# Patient Record
Sex: FEMALE | Race: ASIAN | NOT HISPANIC OR LATINO | Employment: UNEMPLOYED | URBAN - METROPOLITAN AREA
[De-identification: names, ages, dates, MRNs, and addresses within clinical notes are randomized per-mention and may not be internally consistent; named-entity substitution may affect disease eponyms.]

---

## 2021-02-16 ENCOUNTER — APPOINTMENT (OUTPATIENT)
Dept: OBGYN | Facility: CLINIC | Age: 31
End: 2021-02-16

## 2021-02-24 ENCOUNTER — INITIAL PRENATAL (OUTPATIENT)
Dept: OBGYN | Facility: CLINIC | Age: 31
End: 2021-02-24

## 2021-02-24 ENCOUNTER — HOSPITAL ENCOUNTER (OUTPATIENT)
Facility: MEDICAL CENTER | Age: 31
End: 2021-02-24
Attending: NURSE PRACTITIONER
Payer: COMMERCIAL

## 2021-02-24 VITALS
SYSTOLIC BLOOD PRESSURE: 100 MMHG | DIASTOLIC BLOOD PRESSURE: 58 MMHG | WEIGHT: 101 LBS | BODY MASS INDEX: 19.07 KG/M2 | HEIGHT: 61 IN

## 2021-02-24 DIAGNOSIS — Z34.02 SUPERVISION OF NORMAL FIRST PREGNANCY IN SECOND TRIMESTER: Primary | ICD-10-CM

## 2021-02-24 DIAGNOSIS — Z34.02 SUPERVISION OF NORMAL FIRST PREGNANCY IN SECOND TRIMESTER: ICD-10-CM

## 2021-02-24 PROCEDURE — 0500F INITIAL PRENATAL CARE VISIT: CPT | Performed by: NURSE PRACTITIONER

## 2021-02-24 PROCEDURE — 8198 PREG CTR PKG RATE (SYSTEM): Performed by: NURSE PRACTITIONER

## 2021-02-24 RX ORDER — ONDANSETRON 8 MG/1
8 TABLET, ORALLY DISINTEGRATING ORAL EVERY 8 HOURS PRN
Status: ON HOLD | COMMUNITY
End: 2021-07-08

## 2021-02-24 ASSESSMENT — ENCOUNTER SYMPTOMS
NEUROLOGICAL NEGATIVE: 1
RESPIRATORY NEGATIVE: 1
CONSTITUTIONAL NEGATIVE: 1
CARDIOVASCULAR NEGATIVE: 1
PSYCHIATRIC NEGATIVE: 1
EYES NEGATIVE: 1
GASTROINTESTINAL NEGATIVE: 1
MUSCULOSKELETAL NEGATIVE: 1

## 2021-02-24 NOTE — PROGRESS NOTES
Pt. Here for NOB visit today.  # [phone]  First prenatal care  Pt. States no complaints   Pharmacy verified  Pt would like AFP  Pt declines CF   Chaperone offered and not indicated

## 2021-02-24 NOTE — PROGRESS NOTES
S:  Adalid Cortez is a 30 y.o.  who presents for her new OB exam.  She is 15w5d with and FALGUNI of Estimated Date of Delivery: 21 based off of US . She has no complaints.  She is currently not working ,denies  heavy lifting or chemical exposure. Denies ER visits or previous care in this pregnancy, but did have some PNC in [facility], see media.     She did have some nausea in the beginning of his pregnancy and her parnter who is an RN, obtained a prescription for Zofran, so she had been using that up until recently because she is feeling better.  Did have discussion with them regarding small risk of palate abnormalities and cardiac defects with zofran use in first trimester.     Desires AFP.  Declines CF.  Denies VB, LOF, or cramping.  Denies dysuria, vaginal DC. Reports no fetal movement.     Pt is single and lives with FOB.  Pregnancy is planned.  FOB is Mart and he is involved    Discussed diet and exercise during pregnancy. Encouraged good nutrition, and daily exercise including walking or swimming. Discussed expected weight gain during pregnancy. Discussed adequate hydration during pregnancy.  Review of Systems   Constitutional: Negative.    HENT: Negative.    Eyes: Negative.    Respiratory: Negative.    Cardiovascular: Negative.    Gastrointestinal: Negative.    Genitourinary: Negative.    Musculoskeletal: Negative.    Skin: Negative.    Neurological: Negative.    Endo/Heme/Allergies: Negative.    Psychiatric/Behavioral: Negative.    All other systems reviewed and are negative.      History reviewed. No pertinent past medical history.  Family History   Problem Relation Age of Onset   • Diabetes Mother    • No Known Problems Father    • No Known Problems Sister    • No Known Problems Brother      Social History     Socioeconomic History   • Marital status: Single     Spouse name: Not on file   • Number of children: Not on file   • Years of education: Not on file   • Highest education  "level: Not on file   Occupational History   • Not on file   Tobacco Use   • Smoking status: Never Smoker   • Smokeless tobacco: Never Used   Substance and Sexual Activity   • Alcohol use: Not Currently   • Drug use: Not Currently   • Sexual activity: Yes     Partners: Male     Comment: None    Other Topics Concern   • Not on file   Social History Narrative   • Not on file     Social Determinants of Health     Financial Resource Strain:    • Difficulty of Paying Living Expenses:    Food Insecurity:    • Worried About Running Out of Food in the Last Year:    • Ran Out of Food in the Last Year:    Transportation Needs:    • Lack of Transportation (Medical):    • Lack of Transportation (Non-Medical):    Physical Activity:    • Days of Exercise per Week:    • Minutes of Exercise per Session:    Stress:    • Feeling of Stress :    Social Connections:    • Frequency of Communication with Friends and Family:    • Frequency of Social Gatherings with Friends and Family:    • Attends [employer] Services:    • Active Member of Clubs or Organizations:    • Attends Club or Organization Meetings:    • Marital Status:    Intimate Partner Violence:    • Fear of Current or Ex-Partner:    • Emotionally Abused:    • Physically Abused:    • Sexually Abused:      OB History    Para Term  AB Living   1             SAB TAB Ectopic Molar Multiple Live Births                    # Outcome Date GA Lbr Justus/2nd Weight Sex Delivery Anes PTL Lv   1 Current                History of Varicella Virus: unsure  History of HSV I or II in self or partner: denies  History of Thyroid problems: denies    O:  Ht 1.549 m (5' 1\")   Wt 45.8 kg (101 lb)    See Prenatal Physical.    Wet mount: deferred  Physical Exam   Constitutional: She is oriented to person, place, and time and well-developed, well-nourished, and in no distress.   HENT:   Head: Normocephalic and atraumatic.   Right Ear: External ear normal.   Left Ear: External ear normal. "   Eyes: Pupils are equal, round, and reactive to light. Conjunctivae and EOM are normal.   Neck: No thyromegaly present.   Cardiovascular: Normal rate, regular rhythm, normal heart sounds and intact distal pulses.   Pulmonary/Chest: Effort normal and breath sounds normal. No respiratory distress.   Normal breast exam   Abdominal: Soft. Bowel sounds are normal. There is no abdominal tenderness.   Genitourinary:    Vagina and cervix normal.     Musculoskeletal:         General: No edema. Normal range of motion.      Cervical back: Normal range of motion and neck supple.   Neurological: She is alert and oriented to person, place, and time. Gait normal.   Skin: Skin is warm and dry.   Psychiatric: Mood, memory, affect and judgment normal.   Nursing note and vitals reviewed.        A:   1.  IUP @ 15w5d per 6 week US        2.  S=D        3.  See problem list below        4.  Supervision of normal first pregnancy      There are no problems to display for this patient.        P:  1.  GC/CT & pap done        2.  Prenatal labs ordered - lab slip given        3.  Discussed PNV, diet, avoidances and adequate water intake        4.  NOB packet given        5.  Return to office in 4 wks        6.  Complete OB US in 5 wks           Orders Placed This Encounter   • US-OB 2ND 3RD TRI COMPLETE   • URINE DRUG SCREEN W/CONF (AR)   • HEP C VIRUS ANTIBODY   • THINPREP PAP W/HPV AND CTNG   • Prenatal MV-Min-Fe Fum-FA-DHA (PRENATAL 1 PO)   • ondansetron (ZOFRAN ODT) 8 MG TABLET DISPERSIBLE

## 2021-02-25 LAB
C TRACH DNA GENITAL QL NAA+PROBE: NEGATIVE
CYTOLOGY REG CYTOL: NORMAL
HPV HR 12 DNA CVX QL NAA+PROBE: NEGATIVE
HPV16 DNA SPEC QL NAA+PROBE: NEGATIVE
HPV18 DNA SPEC QL NAA+PROBE: NEGATIVE
N GONORRHOEA DNA GENITAL QL NAA+PROBE: NEGATIVE
SPECIMEN SOURCE: NORMAL
SPECIMEN SOURCE: NORMAL

## 2021-02-26 ENCOUNTER — OFFICE VISIT (OUTPATIENT)
Dept: URGENT CARE | Facility: CLINIC | Age: 31
End: 2021-02-26

## 2021-02-26 VITALS
RESPIRATION RATE: 16 BRPM | OXYGEN SATURATION: 96 % | TEMPERATURE: 98.6 F | HEIGHT: 61 IN | DIASTOLIC BLOOD PRESSURE: 76 MMHG | WEIGHT: 102 LBS | BODY MASS INDEX: 19.26 KG/M2 | HEART RATE: 96 BPM | SYSTOLIC BLOOD PRESSURE: 100 MMHG

## 2021-02-26 DIAGNOSIS — R30.0 DYSURIA: ICD-10-CM

## 2021-02-26 DIAGNOSIS — Z3A.16 16 WEEKS GESTATION OF PREGNANCY: ICD-10-CM

## 2021-02-26 DIAGNOSIS — N30.00 ACUTE CYSTITIS WITHOUT HEMATURIA: ICD-10-CM

## 2021-02-26 LAB
APPEARANCE UR: CLEAR
BILIRUB UR STRIP-MCNC: NEGATIVE MG/DL
COLOR UR AUTO: YELLOW
GLUCOSE UR STRIP.AUTO-MCNC: NEGATIVE MG/DL
INT CON NEG: NEGATIVE
INT CON POS: POSITIVE
KETONES UR STRIP.AUTO-MCNC: 40 MG/DL
LEUKOCYTE ESTERASE UR QL STRIP.AUTO: NORMAL
NITRITE UR QL STRIP.AUTO: NEGATIVE
PH UR STRIP.AUTO: 7 [PH] (ref 5–8)
POC URINE PREGNANCY TEST: POSITIVE
PROT UR QL STRIP: NORMAL MG/DL
RBC UR QL AUTO: NORMAL
SP GR UR STRIP.AUTO: 1.01
UROBILINOGEN UR STRIP-MCNC: 1 MG/DL

## 2021-02-26 PROCEDURE — 81025 URINE PREGNANCY TEST: CPT | Performed by: PHYSICIAN ASSISTANT

## 2021-02-26 PROCEDURE — 99204 OFFICE O/P NEW MOD 45 MIN: CPT | Performed by: PHYSICIAN ASSISTANT

## 2021-02-26 PROCEDURE — 81002 URINALYSIS NONAUTO W/O SCOPE: CPT | Performed by: PHYSICIAN ASSISTANT

## 2021-02-26 RX ORDER — NITROFURANTOIN 25; 75 MG/1; MG/1
100 CAPSULE ORAL EVERY 12 HOURS
Qty: 10 CAPSULE | Refills: 0 | Status: SHIPPED | OUTPATIENT
Start: 2021-02-26 | End: 2021-03-03

## 2021-02-26 ASSESSMENT — ENCOUNTER SYMPTOMS
CARDIOVASCULAR NEGATIVE: 1
CHILLS: 1
DIARRHEA: 0
FEVER: 1
ABDOMINAL PAIN: 1
NAUSEA: 1
FLANK PAIN: 0
VOMITING: 1
RESPIRATORY NEGATIVE: 1

## 2021-02-27 ENCOUNTER — HOSPITAL ENCOUNTER (OUTPATIENT)
Facility: MEDICAL CENTER | Age: 31
End: 2021-02-27
Attending: PHYSICIAN ASSISTANT

## 2021-02-27 DIAGNOSIS — N30.00 ACUTE CYSTITIS WITHOUT HEMATURIA: ICD-10-CM

## 2021-02-27 PROCEDURE — 87086 URINE CULTURE/COLONY COUNT: CPT

## 2021-02-27 PROCEDURE — 87077 CULTURE AEROBIC IDENTIFY: CPT

## 2021-02-27 PROCEDURE — 87186 SC STD MICRODIL/AGAR DIL: CPT

## 2021-02-27 NOTE — PROGRESS NOTES
Subjective:      Adalid Cortez is a 30 y.o. female who presents with UTI (x2 days, fever up to 102.6, chills, vomiting, frequent urination, sensation of pressure in bladder )            Patient 16 weeks pregnant.  She has had 2 days of dysuria, frequency, lower abdominal pressure.  Intermittent fever and chills with some vomiting.  No vaginal pain, bleeding noted.    Dysuria   This is a new problem. The current episode started yesterday. The problem occurs every urination. The problem has been gradually worsening. The quality of the pain is described as burning. The maximum temperature recorded prior to her arrival was 102 - 102.9 F. The fever has been present for less than 1 day. There is no history of pyelonephritis. Associated symptoms include chills, frequency, nausea, a possible pregnancy, urgency and vomiting. Pertinent negatives include no flank pain or hematuria. She has tried acetaminophen for the symptoms. The treatment provided mild relief.       PMH:  has no past medical history on file.  MEDS:   Current Outpatient Medications:   •  Prenatal MV-Min-Fe Fum-FA-DHA (PRENATAL 1 PO), Take  by mouth., Disp: , Rfl:   •  ondansetron (ZOFRAN ODT) 8 MG TABLET DISPERSIBLE, Take 8 mg by mouth every 8 hours as needed for Nausea., Disp: , Rfl:   ALLERGIES: No Known Allergies  SURGHX: No past surgical history on file.  SOCHX:  reports that she has never smoked. She has never used smokeless tobacco. She reports previous alcohol use. She reports previous drug use.  FH: family history includes Diabetes in her mother; No Known Problems in her brother, father, and sister.    Review of Systems   Constitutional: Positive for chills and fever.   Respiratory: Negative.    Cardiovascular: Negative.    Gastrointestinal: Positive for abdominal pain, nausea and vomiting. Negative for diarrhea.   Genitourinary: Positive for dysuria, frequency and urgency. Negative for flank pain and hematuria.       Medications,  "Allergies, and current problem list reviewed today in Epic     Objective:     /76 (Patient Position: Sitting)   Pulse 96   Temp 37 °C (98.6 °F) (Temporal)   Resp 16   Ht 1.549 m (5' 1\")   Wt 46.3 kg (102 lb)   SpO2 96%   BMI 19.27 kg/m²      Physical Exam  Vitals and nursing note reviewed.   Constitutional:       General: She is not in acute distress.     Appearance: She is well-developed. She is not diaphoretic.   HENT:      Head: Normocephalic and atraumatic.      Right Ear: Tympanic membrane, ear canal and external ear normal.      Left Ear: Tympanic membrane, ear canal and external ear normal.      Nose: Nose normal.      Mouth/Throat:      Mouth: Mucous membranes are moist.   Eyes:      Conjunctiva/sclera: Conjunctivae normal.   Cardiovascular:      Rate and Rhythm: Normal rate and regular rhythm.      Heart sounds: Normal heart sounds.   Pulmonary:      Effort: Pulmonary effort is normal. No respiratory distress.      Breath sounds: Normal breath sounds. No wheezing, rhonchi or rales.   Abdominal:      General: Abdomen is flat. There is no distension.      Palpations: Abdomen is soft.      Tenderness: There is abdominal tenderness. There is no right CVA tenderness, left CVA tenderness, guarding or rebound.      Comments: Lower abdominal pressure to palpation.  No sharp pains.   Musculoskeletal:      Cervical back: Normal range of motion and neck supple.      Right lower leg: No edema.      Left lower leg: No edema.   Skin:     General: Skin is warm and dry.   Neurological:      Mental Status: She is alert and oriented to person, place, and time.   Psychiatric:         Behavior: Behavior normal.         Thought Content: Thought content normal.         Judgment: Judgment normal.                 Assessment/Plan:         1. Dysuria  POCT Urinalysis    POCT PREGNANCY   2. Acute cystitis without hematuria  Urine Culture    nitrofurantoin (MACROBID) 100 MG Cap   3. 16 weeks gestation of pregnancy   "     Urinalysis: Small blood, large leukocytes    16-week pregnant female presents with 2 days of urinary symptoms.  Some fevers and chills but none today.  One bout of emesis but she is unsure if this is pregnancy related as she has had nausea.  She denies sharp abdominal pain, vaginal pain, vaginal bleeding, back pain.  Eating and drinking normal.  Her urinalysis is consistent with a lower urinary tract infection.  Her vital signs are normal specifically blood pressure, temperature, pulse.  Her exam shows lower abdominal pressure without sharp pains.  No CVA tenderness, rebound or guarding.  There are no clinical signs or symptoms of pyelonephritis at this time.  Strict ER precautions discussed at length.  Recommended follow-up with OB early next week.  OTC meds and conservative measures as discussed    Return to clinic or go to ED if symptoms worsen or persist. Indications for ED discussed at length. Patient/Parent/Guardian voices understanding. Follow-up with your primary care provider in 3-5 days. Red flag symptoms discussed. All side effects of medication discussed including allergic response, GI upset, tendon injury, rash, sedation etc.    Please note that this dictation was created using voice recognition software. I have made every reasonable attempt to correct obvious errors, but I expect that there are errors of grammar and possibly content that I did not discover before finalizing the note.

## 2021-03-01 LAB
BACTERIA UR CULT: ABNORMAL
BACTERIA UR CULT: ABNORMAL
SIGNIFICANT IND 70042: ABNORMAL
SITE SITE: ABNORMAL
SOURCE SOURCE: ABNORMAL

## 2021-03-08 ENCOUNTER — HOSPITAL ENCOUNTER (OUTPATIENT)
Dept: LAB | Facility: MEDICAL CENTER | Age: 31
End: 2021-03-08
Attending: NURSE PRACTITIONER
Payer: COMMERCIAL

## 2021-03-08 DIAGNOSIS — Z34.02 SUPERVISION OF NORMAL FIRST PREGNANCY IN SECOND TRIMESTER: ICD-10-CM

## 2021-03-08 LAB
ABO GROUP BLD: NORMAL
RH BLD: NORMAL

## 2021-03-09 LAB — HCV AB SER QL: NORMAL

## 2021-03-10 LAB
# FETUSES US: NORMAL
AFP MOM SERPL: 0.89
AFP SERPL-MCNC: 55 NG/ML
AGE - REPORTED: 32.9 YR
AMPHET CTO UR CFM-MCNC: NEGATIVE NG/ML
BARBITURATES CTO UR CFM-MCNC: NEGATIVE NG/ML
BENZODIAZ CTO UR CFM-MCNC: NEGATIVE NG/ML
CANNABINOIDS CTO UR CFM-MCNC: NEGATIVE NG/ML
COCAINE CTO UR CFM-MCNC: NEGATIVE NG/ML
CURRENT SMOKER: NO
DRUG COMMENT 753798: NORMAL
FAMILY MEMBER DISEASES HX: NO
GA METHOD: NORMAL
GA: NORMAL WK
HCG MOM SERPL: 0.79
HCG SERPL-ACNC: NORMAL IU/L
HX OF HEREDITARY DISORDERS: NO
IDDM PATIENT QL: NO
INHIBIN A MOM SERPL: 0.95
INHIBIN A SERPL-MCNC: 162 PG/ML
INTEGRATED SCN PATIENT-IMP: NORMAL
METHADONE CTO UR CFM-MCNC: NEGATIVE NG/ML
OPIATES CTO UR CFM-MCNC: NEGATIVE NG/ML
PATHOLOGY STUDY: NORMAL
PCP CTO UR CFM-MCNC: NEGATIVE NG/ML
PROPOXYPH CTO UR CFM-MCNC: NEGATIVE NG/ML
SPECIMEN DRAWN SERPL: NORMAL
U ESTRIOL MOM SERPL: 1.05
U ESTRIOL SERPL-MCNC: 2.39 NG/ML

## 2021-03-22 PROBLEM — Z34.00 PREGNANCY, SUPERVISION OF FIRST: Status: ACTIVE | Noted: 2021-03-22

## 2021-03-24 ENCOUNTER — ROUTINE PRENATAL (OUTPATIENT)
Dept: OBGYN | Facility: CLINIC | Age: 31
End: 2021-03-24

## 2021-03-24 VITALS — DIASTOLIC BLOOD PRESSURE: 58 MMHG | WEIGHT: 102.6 LBS | SYSTOLIC BLOOD PRESSURE: 100 MMHG | BODY MASS INDEX: 19.39 KG/M2

## 2021-03-24 DIAGNOSIS — Z34.02 ENCOUNTER FOR PRENATAL CARE IN SECOND TRIMESTER OF FIRST PREGNANCY: ICD-10-CM

## 2021-03-24 PROCEDURE — 90040 PR PRENATAL FOLLOW UP: CPT | Performed by: NURSE PRACTITIONER

## 2021-03-24 NOTE — PROGRESS NOTES
OB follow up   + fetal movement.  No VB, LOF or UC's.  Pt states she is having pain in her lower back  AFP Negative  [phone] (home)   Preferred pharmacy confirmed.

## 2021-03-24 NOTE — PROGRESS NOTES
SUBJECTIVE:  Pt is a 30 y.o.   at 19w5d  gestation. Presents today for follow-up prenatal care. Reports no issues at this time.  Reports fetal movement. Denies regular cramping/contractions, bleeding or leaking of fluid. Denies dysuria, headaches, N/V. Generally feels well today, reports lower back soreness but not more UTI symptoms, took full week of antibiotics and reports no prior UTI. She is Hoah[employer]ism and is wondering about how she can take part in Ramadan as a pregnant person.     OBJECTIVE:  - See prenatal vitals flow  -   Vitals:    21 1322   BP: 100/58   Weight: 46.5 kg (102 lb 9.6 oz)                 ASSESSMENT:   - IUP at 19w5d    - S=D  Patient Active Problem List    Diagnosis Date Noted   • Supervision of first pregnancy  2021         PLAN:  - S/sx pregnancy and labor warning signs vs general discomforts discussed  - Fetal movements and/or kick counts reviewed   - Adequate hydration reinforced  - Nutrition/exercise/vitamin education; continue PNV  - Urine SHAWNEE  - Already got flu vaccine beginning of this winter   - Reviewed that she may have a lot of side effects from not eating or drinking during Ramadan times, especially in relation to her recent UTI  - US scheduled   - Anticipatory guidance given  - RTC in 4 weeks for follow-up prenatal care

## 2021-03-31 ENCOUNTER — APPOINTMENT (OUTPATIENT)
Dept: RADIOLOGY | Facility: IMAGING CENTER | Age: 31
End: 2021-03-31
Attending: NURSE PRACTITIONER

## 2021-03-31 DIAGNOSIS — Z34.02 SUPERVISION OF NORMAL FIRST PREGNANCY IN SECOND TRIMESTER: ICD-10-CM

## 2021-03-31 PROCEDURE — 76805 OB US >/= 14 WKS SNGL FETUS: CPT | Mod: TC | Performed by: NURSE PRACTITIONER

## 2021-04-07 ENCOUNTER — HOSPITAL ENCOUNTER (OUTPATIENT)
Facility: MEDICAL CENTER | Age: 31
End: 2021-04-07
Attending: NURSE PRACTITIONER
Payer: COMMERCIAL

## 2021-04-07 DIAGNOSIS — Z34.02 ENCOUNTER FOR PRENATAL CARE IN SECOND TRIMESTER OF FIRST PREGNANCY: ICD-10-CM

## 2021-04-07 LAB
APPEARANCE UR: CLEAR
BACTERIA #/AREA URNS HPF: ABNORMAL /HPF
BILIRUB UR QL STRIP.AUTO: NEGATIVE
COLOR UR: YELLOW
EPI CELLS #/AREA URNS HPF: ABNORMAL /HPF
GLUCOSE UR STRIP.AUTO-MCNC: 250 MG/DL
HYALINE CASTS #/AREA URNS LPF: ABNORMAL /LPF
KETONES UR STRIP.AUTO-MCNC: NEGATIVE MG/DL
LEUKOCYTE ESTERASE UR QL STRIP.AUTO: ABNORMAL
MICRO URNS: ABNORMAL
NITRITE UR QL STRIP.AUTO: NEGATIVE
PH UR STRIP.AUTO: 7 [PH] (ref 5–8)
PROT UR QL STRIP: NEGATIVE MG/DL
RBC # URNS HPF: ABNORMAL /HPF
RBC UR QL AUTO: NEGATIVE
SP GR UR STRIP.AUTO: 1.02
UROBILINOGEN UR STRIP.AUTO-MCNC: 1 MG/DL
WBC #/AREA URNS HPF: ABNORMAL /HPF

## 2021-04-21 ENCOUNTER — ROUTINE PRENATAL (OUTPATIENT)
Dept: OBGYN | Facility: CLINIC | Age: 31
End: 2021-04-21

## 2021-04-21 VITALS — DIASTOLIC BLOOD PRESSURE: 46 MMHG | BODY MASS INDEX: 19.65 KG/M2 | SYSTOLIC BLOOD PRESSURE: 90 MMHG | WEIGHT: 104 LBS

## 2021-04-21 DIAGNOSIS — Z34.02 ENCOUNTER FOR PRENATAL CARE IN SECOND TRIMESTER OF FIRST PREGNANCY: Primary | ICD-10-CM

## 2021-04-21 PROCEDURE — 90040 PR PRENATAL FOLLOW UP: CPT | Performed by: NURSE PRACTITIONER

## 2021-04-21 NOTE — PROGRESS NOTES
S) Pt is a 30 y.o.   at 23w5d  gestation. Routine prenatal care today. Pt participating in Ramaden, fasting x 9 days now but feeling well.  Wakes at 0430 for last meal of day.   Fetal movement Normal  Cramping no  VB no  LOF no   Denies dysuria. Generally feels well today. Good self-care activities identified. Denies headaches, swelling, visual changes, or epigastric pain .     O) See flow sheet for vital signs and fetal measurements.          Labs:       PNL:  WNL       GCT:       AFP: normal       GBS: N/A       Pertinent ultrasound - 3/31/21 normal anatomy scan           A) IUP at 23w5d       S=D         Patient Active Problem List    Diagnosis Date Noted   • Supervision of first pregnancy  2021          SVE: deferred         TDAP: no       FLU: no        BTL: no       : no       C/S Consent: no       IOL or C/S scheduled: no       LAST PAP: 21 NILM         P) s/s ptl vs general discomforts. Fetal movements reviewed. General ed and anticipatory guidance. Nutrition/exercise/vitamin. Plans breast Plans pp contraception- unsure  Continue PNV.   Given 3rd trimester labs with instructions  RTC 4 weeks or PRN  Discussed TDAP next visit

## 2021-04-21 NOTE — PROGRESS NOTES
Pt here today for OB follow up  Reports +FM  WT: 104 lb  BP: 90/46  Preferred pharmacy verified with pt.  Pt states no complaints or concerns today  3rd trimester labs ordered today, pt given instructions.  Tyree # [phone]

## 2021-05-10 ENCOUNTER — HOSPITAL ENCOUNTER (OUTPATIENT)
Dept: LAB | Facility: MEDICAL CENTER | Age: 31
End: 2021-05-10
Attending: NURSE PRACTITIONER
Payer: COMMERCIAL

## 2021-05-10 DIAGNOSIS — Z34.02 ENCOUNTER FOR PRENATAL CARE IN SECOND TRIMESTER OF FIRST PREGNANCY: ICD-10-CM

## 2021-05-10 LAB
ERYTHROCYTE [DISTWIDTH] IN BLOOD BY AUTOMATED COUNT: 44.1 FL (ref 35.9–50)
GLUCOSE 1H P 50 G GLC PO SERPL-MCNC: 184 MG/DL (ref 70–139)
HCT VFR BLD AUTO: 34.3 % (ref 37–47)
HGB BLD-MCNC: 11.2 G/DL (ref 12–16)
MCH RBC QN AUTO: 30.3 PG (ref 27–33)
MCHC RBC AUTO-ENTMCNC: 32.7 G/DL (ref 33.6–35)
MCV RBC AUTO: 92.7 FL (ref 81.4–97.8)
PLATELET # BLD AUTO: 407 K/UL (ref 164–446)
PMV BLD AUTO: 8.5 FL (ref 9–12.9)
RBC # BLD AUTO: 3.7 M/UL (ref 4.2–5.4)
TREPONEMA PALLIDUM IGG+IGM AB [PRESENCE] IN SERUM OR PLASMA BY IMMUNOASSAY: NORMAL
WBC # BLD AUTO: 14.6 K/UL (ref 4.8–10.8)

## 2021-05-12 DIAGNOSIS — R73.09 ELEVATED GLUCOSE TOLERANCE TEST: ICD-10-CM

## 2021-05-13 NOTE — PROGRESS NOTES
Called pt regarding last mychart message from 21.  Verified name and . Advised pt to go to L&D for assessment as still not feeling well today with fever persisting.

## 2021-05-28 ENCOUNTER — ROUTINE PRENATAL (OUTPATIENT)
Dept: OBGYN | Facility: CLINIC | Age: 31
End: 2021-05-28

## 2021-05-28 VITALS — DIASTOLIC BLOOD PRESSURE: 60 MMHG | SYSTOLIC BLOOD PRESSURE: 100 MMHG | BODY MASS INDEX: 19.69 KG/M2 | WEIGHT: 104.2 LBS

## 2021-05-28 DIAGNOSIS — Z34.03 ENCOUNTER FOR PRENATAL CARE IN THIRD TRIMESTER OF FIRST PREGNANCY: Primary | ICD-10-CM

## 2021-05-28 PROCEDURE — 90715 TDAP VACCINE 7 YRS/> IM: CPT | Performed by: NURSE PRACTITIONER

## 2021-05-28 PROCEDURE — 90471 IMMUNIZATION ADMIN: CPT | Performed by: NURSE PRACTITIONER

## 2021-05-28 PROCEDURE — 90040 PR PRENATAL FOLLOW UP: CPT | Performed by: NURSE PRACTITIONER

## 2021-05-28 NOTE — LETTER
"Count Your Baby's Movements  Another step to a healthy delivery    Negar Cortez             Dept: [phone]    How Many Weeks Pregnant? 29W0D    Date to Begin Countin2021              How to use this chart    One way for your physician to keep track of your baby's health is by knowing how often the baby moves (or \"kicks\") in your womb.  You can help your physician to do this by using this chart every day.    Every day, you should see how many hours it takes for your baby to move 10 times.  Start in the morning, as soon as you get up.    · First, write down the time your baby moves until you get to 10.  · Check off one box every time your baby moves until you get to 10.  · Write down the time you finished counting in the last column.  · Total how long it took to count up all 10 movements.  · Finally, fill in the box that shows how long this took.  After counting 10 movements, you no longer have to count any more that day.  The next morning, just start counting again as soon as you get up.    What should you call a \"movement\"?  It is hard to say, because it will feel different from one mother to another and from one pregnancy to the next.  The important thing is that you count the movements the same way throughout your pregnancy.  If you have more questions, you should ask your physician.    Count carefully every day!  SAMPLE:  Week 28    How many hours did it take to feel 10 movements?       Start  Time     1     2     3     4     5     6     7     8     9     10   Finish Time   Mon 8:20 ·  ·  ·  ·  ·  ·  ·  ·  ·  ·  11:40                  Sat               Sun                 IMPORTANT: You should contact your physician if it takes more than two hours for you to feel 10 movements.  Each morning, write down the time and start to count the movements of your baby.  Keep track by checking off one box every time you feel one movement.  When you have " "felt 10 \"kicks\", write down the time you finished counting in the last column.  Then fill in the   box (over the check ar) for the number of hours it took.  Be sure to read the complete instructions on the previous page.            "

## 2021-05-28 NOTE — PROGRESS NOTES
OB follow up   + fetal movement. Active  No VB, LOF or UC's.  Phone #  [phone]  Preferred pharmacy confirmed.  C/o  Cramping that comes and goes   BTL Ask next appt   TDAP Today  Kick count sheet given today.    TDAP  NDC: 21873-117-82  LOT#: [id number]  Expiration Date: 2023  Dose: 0.5mL  Site: Left Deltoid  Patient educated on use and side effects of medication. Name and  verified prior to injection. Pt tolerated? Well   Verified by NUBIA  Administered by Katie Mello, Med Ass't at 3:22 PM.  Patient Provided Medication: no

## 2021-05-28 NOTE — PROGRESS NOTES
S:  Reports she went to the hospital ([facility]) and had an inflamed gallbladder.  They removed it on Tuesday.  She has been feeling much better.  Still needs to make a post-op appt.  Still needs to complete her 3hr GTT.  Also notes some cramping, usually at night.  Reports good FM.  Denies VB, LOF, RUCs or vaginal DC.     O:  See flowsheet.   Incisions are CDI.    A:  IUP at 29w0d  Patient Active Problem List    Diagnosis Date Noted   • Supervision of first pregnancy  2021        P:  1.  Encouraged pt to make post-op appt w surgeon.        2.  Instructions given on FKCs.          3.  Questions answered.          4.  Encouraged pt to complete 3hr GTT asap.        5.  Encourage adequate water intake.        6.  D/w pt helps for cramps        7.   labor precautions reviewed.         8.  F/u 2 wks.        9.  TDap given.    I have placed the below orders and discussed them with an approved delegating provider. The MA is performing the below orders under the direction of  Dr. Edmondson.

## 2021-06-02 ENCOUNTER — HOSPITAL ENCOUNTER (OUTPATIENT)
Dept: LAB | Facility: MEDICAL CENTER | Age: 31
End: 2021-06-02
Attending: NURSE PRACTITIONER
Payer: COMMERCIAL

## 2021-06-02 DIAGNOSIS — Z3A.29 29 WEEKS GESTATION OF PREGNANCY: ICD-10-CM

## 2021-06-02 DIAGNOSIS — R73.09 ELEVATED GLUCOSE TOLERANCE TEST: ICD-10-CM

## 2021-06-02 LAB
GLUCOSE 1H P CHAL SERPL-MCNC: 269 MG/DL (ref 65–180)
GLUCOSE 2H P CHAL SERPL-MCNC: 272 MG/DL (ref 65–155)
GLUCOSE 3H P CHAL SERPL-MCNC: 238 MG/DL (ref 65–140)
GLUCOSE BS SERPL-MCNC: 107 MG/DL (ref 65–95)

## 2021-06-03 ENCOUNTER — TELEPHONE (OUTPATIENT)
Dept: OBGYN | Facility: CLINIC | Age: 31
End: 2021-06-03

## 2021-06-03 NOTE — TELEPHONE ENCOUNTER
----- Message from PETAR Caruso sent at 6/2/2021  8:52 PM PDT -----  Pt is diabetic, needs hgba1c, diabetic ed and MD appt.       6/3/2021 1149 Called pt but no answer and unable to leave VM. mail box is full and cannot accept any message at this time. Will try again later  6/7/2021 0926 Left message for pt to call back regarding lab results.   0941 pt called back and Pt notified of Dx of GDM and needs to go to GDM class and f/u at [facility] with MD. GDM class scheduled for 6/9/2021 at 1330, explained class is 2hrs long and they need to bring glucose meter, test strips and lancets to the class and GDM f/u at [facility] on 6/17/2021 at 1330. Address and phone to GDM class given to pt. Pt aware to bring in her log book and meter to GDM f/u with MD. Advised pt to do Hgb A1C before next appt with provider. Pt agreed and verbalized understanding.

## 2021-06-07 ENCOUNTER — HOSPITAL ENCOUNTER (OUTPATIENT)
Dept: LAB | Facility: MEDICAL CENTER | Age: 31
End: 2021-06-07
Attending: NURSE PRACTITIONER
Payer: COMMERCIAL

## 2021-06-07 DIAGNOSIS — Z3A.29 29 WEEKS GESTATION OF PREGNANCY: ICD-10-CM

## 2021-06-07 LAB
EST. AVERAGE GLUCOSE BLD GHB EST-MCNC: 111 MG/DL
HBA1C MFR BLD: 5.5 % (ref 4–5.6)

## 2021-06-09 ENCOUNTER — NON-PROVIDER VISIT (OUTPATIENT)
Dept: HEALTH INFORMATION MANAGEMENT | Facility: MEDICAL CENTER | Age: 31
End: 2021-06-09

## 2021-06-09 VITALS — BODY MASS INDEX: 19.56 KG/M2 | WEIGHT: 103.6 LBS | HEIGHT: 61 IN

## 2021-06-09 DIAGNOSIS — O24.419 GESTATIONAL DIABETES MELLITUS (GDM) IN THIRD TRIMESTER, GESTATIONAL DIABETES METHOD OF CONTROL UNSPECIFIED: ICD-10-CM

## 2021-06-09 PROCEDURE — G0109 DIAB MANAGE TRN IND/GROUP: HCPCS | Performed by: INTERNAL MEDICINE

## 2021-06-09 NOTE — PROGRESS NOTES
The pt received a 1600 calorie meal plan (based on 35 kcal/kg of current weight) consisting of 3 meals and 3 snacks (see media for copy of meal plan).   Pt's prepregnancy weight was: 93lb. She has gained 10.7lb so far in this pregnancy.   Recommended meal times:   B: 9  S: 11  L: 2  S: 5  D: 8  S: 11   Pt was educated on carbohydrate containing foods vs non carbohydrate containing foods, the importance of small frequent meals, limiting carbohydrate to 1 serving in the morning, no fruit before noon/12pm, and avoiding all concentrated sweets for the remainder of her pregnancy. Explained the importance of not going >4hrs btwn eating during the day and no longer than 10hours overnight. Patient agrees to follow the meal plan and guidelines provided.

## 2021-06-09 NOTE — LETTER
June 9, 2021                   Re: Adalid Cortez     1990         [id number]       Ting Maldonado A.P.R.N.  [address] [address]      Dear :Ting Maldonado A.*    On 6/9/2021, your patient Adalid Cortez, received 1 hours of nutrition training from the Diabetes Center at [facility] for management of her gestational diabetes.  Her EDC is Estimated Date of Delivery: 8/13/21.  We taught the following subjects:    Patient was provided with a 1600 calorie meal plan with 3 meals and 3 snacks.  Meal plan contains 165 grams of carbohydrates per day.   Importance of meal planning in diabetes management during pregnancy  Importance of consistent timing of meals and snacks and agreed upon times  Avoidance of simple carbohydrates  Metabolism of food components relating to pregnancy  Identification of foods in food groups  Patient demonstrates adequate ability to utilize meal planning manual for reference  Plan 3 meals and 3 snacks with 90% accuracy  Review basic principles of eating out  Reviewed precautions with artificial sweeteners    Comments: Negar agreed to follow the meal plan and to eat at the times agreed upon.  She will check blood glucose 4 times a day and record the values in her log book.      Hopefully this will help in your management of her care.  If we can be of further assistance, please feel free to call.    Thank you for the referral.    Sincerely,    Ellen Garcia, MS, RD,LD  [facility]  [phone]

## 2021-06-09 NOTE — PROGRESS NOTES
Negar came to the Gestational Diabetes program.  She states her mother has Type 2 Diabetes.  She recently had her gallbladder removed.  She brought in a Relion meter and was shown how to use it.  She was able to do a return demonstration without difficulty. She will keep walking and stay well hydrated with water. Her meal plan is scanned into Media and her Doctor Letters with what was taught can be found under the Letters tab.

## 2021-06-09 NOTE — LETTER
June 9, 2021                   Re: Adalid Cortez     1990         [id number]       PETAR Caruso  [address] [address]  [place] [address]      Dear :Ting Maldonado A.P.R.N.    On 6/9/2021, your patient Adalid Cortez, received 1 hour of nurse training from the Diabetes Center at [facility] for management of her gestational diabetes.  Her Estimated Date of Delivery: 8/13/21.  We taught the following subjects:    Introduction to gestational diabetes, benefits and responsibilities of patient, physiology of diabetes and the diease process, benefits of blood glucose monitoring and record keeping, medication action and possible side effects, hypoglycemia, sick day management, exercise, stress reduction and travel with diabetes.       Nurse assessment / Education:    Comments:        Weight:Weight: 47 kg (103 lb 9.6 oz)         Complaints:  Yes, recent gallbladder surgery      Pathophysiology of diabetes in pregnancy    Discuss  potential maternal and fetal complications in pregnancy with diabetes.     Importance of blood glucose monitoring   Proper testing technique using a relion meter.    At 2 pm, the meter read 101, which was 2.5 hours after eating.  Testing: fasting and one hour after meals,  expected ranges and rationale for strict control.     Ketone test today:no       Recognition and treatment of hypoglycemia.     Insulin taught: No  Insulin briefly dicussed at this time.    Should patient require insulin later in pregnancy, she would need further education.     Reviewed fetal kick counts and other tests to determine fetal well-being  Discuss benefits and risks of exercise in pregnancy  Discuss when to call Doctor  Discuss sick day care  Importance of wearing diabetes identification      Patient/caregiver appeared to understand the content as demonstrated by appropriate questions.     Adalid Cortez was encouraged to discuss this  further with you.    Hopefully this will help in your management of her care.  If we can be of further assistance, please feel free to call.    Thank you for the referral.    Sincerely,    Kizzy Wang RN CDE  GDM CLASS  Certified Diabetes Educator

## 2021-06-17 ENCOUNTER — NON-PROVIDER VISIT (OUTPATIENT)
Dept: OBGYN | Facility: CLINIC | Age: 31
End: 2021-06-17

## 2021-06-17 ENCOUNTER — ROUTINE PRENATAL (OUTPATIENT)
Dept: OBGYN | Facility: CLINIC | Age: 31
End: 2021-06-17

## 2021-06-17 VITALS — WEIGHT: 104 LBS | BODY MASS INDEX: 19.65 KG/M2 | SYSTOLIC BLOOD PRESSURE: 100 MMHG | DIASTOLIC BLOOD PRESSURE: 60 MMHG

## 2021-06-17 DIAGNOSIS — O24.419 GESTATIONAL DIABETES MELLITUS (GDM), ANTEPARTUM, GESTATIONAL DIABETES METHOD OF CONTROL UNSPECIFIED: ICD-10-CM

## 2021-06-17 DIAGNOSIS — O24.410 DIET CONTROLLED GESTATIONAL DIABETES MELLITUS (GDM) IN THIRD TRIMESTER: ICD-10-CM

## 2021-06-17 PROCEDURE — 97802 MEDICAL NUTRITION INDIV IN: CPT | Performed by: DIETITIAN, REGISTERED

## 2021-06-17 PROCEDURE — 90040 PR PRENATAL FOLLOW UP: CPT | Performed by: OBSTETRICS & GYNECOLOGY

## 2021-06-17 NOTE — PROGRESS NOTES
GDM Class  New    . OB F/U.  + fetal movement Active  Denies any VB, LO or UC's  Phone # [phone]  Pharmacy confirmed  Diabetic supplies N/a  No complaints as of today

## 2021-06-17 NOTE — PROGRESS NOTES
Initial Nutrition Assessment   Referring Provider: PETAR Caruso  Time: 1:38-1:47 pm       Subjective:  -Finding following meal timing challenging, especially on the evenings she works 7 pm-2 am shift (works from home on call)   -Not regularly eating snacks as eating 6x a day is new for her, but did prepare snacks for to have easy to grab      Diet hx:   B- toast w/ PB or cream cheese, egg  S- 0 or saltines   L- quinoa, chicken or beef with veggies if available   S- 0 or fruit   D- same as lunch   S- 0     Objective:   Anthropometrics:  Today's weight: 104 lb   Pre-pregnancy weight: 93 lb   Total weight gain: 11 lb   Weeks gestation: 31w6d    Biochemical data, medical test and procedures:  Lab Results   Component Value Date/Time    HBA1C 5.5 06/07/2021 11:02 AM   @  No results found for: POCGLUCOSE    OGTT Results: 269, 272, 238     Glucose Log Book (most recent):    Fasting glucose range: 86-99 (4/6>90)   1 hour glucose post prandial range:  (1/12>140)     Assessment:   Nutrition Diagnosis (PES Statement):  · Altered nutrition related lab values related to endocrine dysfunction as evidenced by OGTT    Recommended Diet:  1600 calorie meal plan (see media for detailed meal plan)  3 meals and 3 snacks   1 carb choice at breakfast w/ 2oz protein and 1 fat   No concentrated sweets for remainder of pregnancy   No fruit in the morning   No sweetened beverages  No alcohol in pregnancy   Do not go exceed 4 hours during day or >10 hours overnight without eating     Assessment Notes:   Greater than 50% of Negar's fasting readings are above 90. We discussed working on adding in snacks to ensure she is getting adequate calories and nutrition and emphasized having a bedtime snack for helping with her fasting numbers. She will try different options discussed writing them in her journal to see which ones give her the best blood sugar the following morning. Reviewed meal timing and she will continue working on  the timing provided to her in class w/o any changes.     Plan: Monitoring & Evaluation  Goals:  1. Follow meal plan as outlined above; 3 meals and 3 snacks daily.   2. Check FSBS 4 times a day (fasting and 1 hour after each meal)  3. FSBS Goals= Fasting <90; 1 hour PP <130   4. Bring blood sugar log book to each appointment   5. Appropriate weight gain during pregnancy   6. Have bedtime snack consistently and trial different options discussed  7. Continue working on meal timing       Follow up 1-2 weeks   Dai Garcia MS, RD, LD  [phone]

## 2021-06-24 ENCOUNTER — HOSPITAL ENCOUNTER (OUTPATIENT)
Dept: RADIOLOGY | Facility: MEDICAL CENTER | Age: 31
End: 2021-06-24
Attending: OBSTETRICS & GYNECOLOGY

## 2021-06-24 ENCOUNTER — NON-PROVIDER VISIT (OUTPATIENT)
Dept: OBGYN | Facility: CLINIC | Age: 31
End: 2021-06-24

## 2021-06-24 ENCOUNTER — ROUTINE PRENATAL (OUTPATIENT)
Dept: OBGYN | Facility: CLINIC | Age: 31
End: 2021-06-24

## 2021-06-24 VITALS — DIASTOLIC BLOOD PRESSURE: 62 MMHG | BODY MASS INDEX: 19.46 KG/M2 | WEIGHT: 103 LBS | SYSTOLIC BLOOD PRESSURE: 104 MMHG

## 2021-06-24 DIAGNOSIS — O24.419 GESTATIONAL DIABETES MELLITUS (GDM), ANTEPARTUM, GESTATIONAL DIABETES METHOD OF CONTROL UNSPECIFIED: ICD-10-CM

## 2021-06-24 DIAGNOSIS — O26.843 UTERINE SIZE-DATE DISCREPANCY IN THIRD TRIMESTER: ICD-10-CM

## 2021-06-24 PROCEDURE — 90040 PR PRENATAL FOLLOW UP: CPT | Performed by: OBSTETRICS & GYNECOLOGY

## 2021-06-24 PROCEDURE — 76816 OB US FOLLOW-UP PER FETUS: CPT

## 2021-06-24 PROCEDURE — 97803 MED NUTRITION INDIV SUBSEQ: CPT | Performed by: DIETITIAN, REGISTERED

## 2021-06-24 NOTE — PROGRESS NOTES
"Follow Up Nutrition Assessment:   Time: 2:52-3:00 pm     Subjective:  -Still struggling to consistently consume snacks between meals   -Dinner late leaving no time for a HS snack   -Concerned about continued weight loss     Objective:   Anthropometrics:   Today's Weight: 103 lb    Pre-pregnancy weight: 93 lb   Total weight gain: 10 lb (1 lb loss since last week)  Weeks gestation: 32w6d    Biochemical data, medical test and procedures:  Glucose Log Book (most recent):    Fasting glucose range: some >100   1 hour post prandial glucose range: most WNL     Assessment:   Recommended Diet:  1600 calorie meal plan (see media for detailed meal plan)    Assessment Notes:   Discussed \"power packing\" strategies to help with weight gain by adding in low volume foods such as avocado, nuts, nut butters, spreads, butter/oil, whole fat dairy products, and olives (handout provided). Provided recommendations for new meal timing and encouraged Negar to have dinner earlier to accommodate for a bedtime snack. She is working from home therefore has flexibility for this adjustment to be made.     Plan: Monitoring & Evaluation  Goals:  1. Follow meal plan as outlined above; 3 meals and 3 snacks daily.   2. Check FSBS 4 times a day  (fasting, and 1 hour after each meal)  3. FSBS Goals= Fasting <90; 1 hour PP <130   4. Bring blood sugar log book to each appointment   5. Appropriate weight gain during pregnancy  6. Add low volume power packing foods   7. Don't skip snacks   8. Follow meal timing discussed       Dai Garcia MS, RD, LD  [phone]           "

## 2021-06-24 NOTE — PROGRESS NOTES
OB Follow Up--- High Risk  gestational diabetes and Short statute           30 y.o. is a 32w6d presents in prenatal follow up.    Subjective:no complaints  . Fetal Movement  positive    Problem List:does not have any pertinent problems on file.     Objective:   /62   Wt 46.7 kg (103 lb)   BMI 19.46 kg/m²     Abdomen:  S<D, FH 28    Extremities:Normal        Lab:No results found for this or any previous visit (from the past 336 hour(s)).  Blood sugar Log   FBS < 90  Postprandial  < 130       Assessment:    32w6d      High Risk  gestational diabetes and Short statute     Marked S < date , consider  IUGR   Weight loss   No pain, bleeding, unusual discharge or leeking    Plan:  Urgent  U/S   R/O IUGR   Dietitian to check meals   Continue water intake  Ambulate nightly after 37 weeks  Continue Kick counts

## 2021-06-24 NOTE — NON-PROVIDER
. OB F/U.  + fetal movement  Denies any VB, LO or UC's  Phone # [phone]  Pharmacy confirmed  Pt states slight cramping only at night   And is concerned about weight loss

## 2021-07-01 ENCOUNTER — NON-PROVIDER VISIT (OUTPATIENT)
Dept: OBGYN | Facility: CLINIC | Age: 31
End: 2021-07-01

## 2021-07-01 ENCOUNTER — ROUTINE PRENATAL (OUTPATIENT)
Dept: OBGYN | Facility: CLINIC | Age: 31
End: 2021-07-01

## 2021-07-01 VITALS — DIASTOLIC BLOOD PRESSURE: 66 MMHG | WEIGHT: 103 LBS | BODY MASS INDEX: 19.46 KG/M2 | SYSTOLIC BLOOD PRESSURE: 110 MMHG

## 2021-07-01 DIAGNOSIS — Z34.03 ENCOUNTER FOR SUPERVISION OF NORMAL FIRST PREGNANCY IN THIRD TRIMESTER: ICD-10-CM

## 2021-07-01 DIAGNOSIS — O24.419 GESTATIONAL DIABETES MELLITUS (GDM), ANTEPARTUM, GESTATIONAL DIABETES METHOD OF CONTROL UNSPECIFIED: ICD-10-CM

## 2021-07-01 DIAGNOSIS — O23.43 UTI (URINARY TRACT INFECTION) DURING PREGNANCY, THIRD TRIMESTER: ICD-10-CM

## 2021-07-01 DIAGNOSIS — R30.0 DYSURIA: ICD-10-CM

## 2021-07-01 DIAGNOSIS — O24.419 GDM, CLASS A2: ICD-10-CM

## 2021-07-01 DIAGNOSIS — O26.843 UTERINE SIZE-DATE DISCREPANCY IN THIRD TRIMESTER: ICD-10-CM

## 2021-07-01 LAB
APPEARANCE UR: NORMAL
BILIRUB UR STRIP-MCNC: NORMAL MG/DL
COLOR UR AUTO: YELLOW
GLUCOSE UR STRIP.AUTO-MCNC: NEGATIVE MG/DL
KETONES UR STRIP.AUTO-MCNC: NEGATIVE MG/DL
LEUKOCYTE ESTERASE UR QL STRIP.AUTO: NORMAL
NITRITE UR QL STRIP.AUTO: NEGATIVE
PH UR STRIP.AUTO: 5.5 [PH] (ref 5–8)
PROT UR QL STRIP: NEGATIVE MG/DL
RBC UR QL AUTO: NORMAL
SP GR UR STRIP.AUTO: 1.01
UROBILINOGEN UR STRIP-MCNC: NORMAL MG/DL

## 2021-07-01 PROCEDURE — 97803 MED NUTRITION INDIV SUBSEQ: CPT | Performed by: DIETITIAN, REGISTERED

## 2021-07-01 PROCEDURE — 90040 PR PRENATAL FOLLOW UP: CPT | Performed by: OBSTETRICS & GYNECOLOGY

## 2021-07-01 PROCEDURE — 81002 URINALYSIS NONAUTO W/O SCOPE: CPT | Performed by: OBSTETRICS & GYNECOLOGY

## 2021-07-01 RX ORDER — NAPROXEN SODIUM 220 MG
TABLET ORAL
Qty: 50 EACH | Refills: 2 | Status: SHIPPED | OUTPATIENT
Start: 2021-07-01

## 2021-07-01 RX ORDER — NAPROXEN SODIUM 220 MG
TABLET ORAL
Qty: 50 EACH | Refills: 2 | Status: SHIPPED | OUTPATIENT
Start: 2021-07-01 | End: 2021-07-01

## 2021-07-01 RX ORDER — NITROFURANTOIN 25; 75 MG/1; MG/1
100 CAPSULE ORAL 2 TIMES DAILY
Qty: 14 CAPSULE | Refills: 0 | Status: SHIPPED | OUTPATIENT
Start: 2021-07-01

## 2021-07-01 NOTE — PROGRESS NOTES
S: Pt presents for routine OB follow up And to discuss growth ultrasound  .  Patient is doing well currently.  Reports good fetal movement.  States she had some low-grade temperature last week lasting about a day and a half and her temperature was 100.3.  Denies any fevers or respiratory symptoms.  Reports no changes in bowel bladder functions.  Denies headaches or scotoma  No contractions, vaginal bleeding, or leakage of fluid.    Questions answered.    O:  VSS, AF  Patients' weight gain, fluid intake and exercise level discussed.  Vitals, fundal height , fetal position, and FHR reviewed on flowsheet    Lab:No results found for this or any previous visit (from the past 336 hour(s)).    6/24/2021 4:56 PM     HISTORY/REASON FOR EXAM:  Size less than dates     TECHNIQUE/EXAM DESCRIPTION: OB limited ultrasound.     COMPARISON:  Obstetrical ultrasound 3/31/2021     FINDINGS:  Fetal Lie:  Vertex  LMP:  11/06/2020  Clinical FALGUNI by LMP:  08/13/2021     Placenta (Location):  Posterior fundal  Placenta Previa: No  Placental Grade: I-II     Amniotic Fluid Volume:  HERNANDEZ = 13.28 cm     Fetal Heart Rate:  120 bpm     No maternal adnexal mass is identified.     SD ratios are normal at 2.5, 2.6 and 2.1.     Fetal Biometry  BPD    7.86 cm, 31 weeks, 4 days, (11 Percent)  HC    29.26 cm, 32 weeks, 2 days, (7 Percent)  AC    27.92 cm, 32 weeks, 0 days, (25 Percent)  Femur Length    6.00 cm, 31 weeks, 2 days, (7 Percent)  Humerus Length    5.33 cm, 31 weeks, 1 days, (20 Percent)     EGA by this US:  31 weeks, 6 days  FALGUNI by this US: 08/20/2021  FALGUNI by [facility] US:  8/14/2021     Estimated Fetal Weight:  1828 grams  EFW Percentile: 13 Percent     Comments:  The ultrasound dates are approximately 1 week less than the clinical dates.     IMPRESSION:     1.  Single intrauterine pregnancy of an estimated gestational age of 31 weeks, 6 days with an estimated date of delivery of 08/20/2021.        Glucose log:  Fasting   Postprandial:  104-137  We discussed persistently elevated fasting fingersticks and need for insulin.  Insulin teaching done today and patient was provided a vial of NPH insulin.  She will do 10 units nightly       A/P:  30 y.o.  at 33w6d presents for routine obstetric follow-up.  Doing well currently  Size < dates.  Growth ultrasound was normal range 13 percentile and repeat ultrasound in 3 weeks ordered.  Patient is now GDM A2 and insulin teaching was done.  She will do 10 units of NPH nightly and follow-up in a week for reassessment.  We will start NSTs from next week.  Urine analysis shows large leukocytes and blood and Macrobid was prescribed    Encounter Diagnoses   Name Primary?   • Encounter for supervision of normal first pregnancy in third trimester    • Uterine size-date discrepancy in third trimester    • GDM, class A2    • Dysuria    • UTI (urinary tract infection) during pregnancy, third trimester          1.  Continue prenatal vitamins.  2.  Fetal kick counts daily discussed.  3.  Exercise at least 30 minutes daily.  4.  Drink at least 2L of water daily  5.  Pregnancy and  labor precautions educated.  6.  Follow-up in 1 week  7.  NST 2 times per week  8.  Growth ultrasound ordered  9.  UTI discussed and Macrobid prescribed  10.  Precautions and plan of care were reviewed

## 2021-07-01 NOTE — PROGRESS NOTES
OB follow up - A1 GDM  + FM, Denies VB, LOF or UC's.  Phone # [phone]  Preferred pharmacy confirmed  Pt denies any problems

## 2021-07-01 NOTE — PROGRESS NOTES
Follow Up Nutrition Assessment:   Time: 11:30-11:34 am     Subjective:  - Feels she is doing better eating 6x a day and adding in snacks  - Added in nuts, nut butter and whole milk     Objective:   Anthropometrics:   Today's Weight: 103 lb   Pre-pregnancy weight: 93 lb  Total weight gain: 10 lb  Weeks gestation: 33w6d    Biochemical data, medical test and procedures:  No results found for: POCGLUCOSE    Glucose Log Book (most recent):    Fasting glucose range:     1 hour post prandial glucose range: 104-137    Assessment:   Recommended Diet:  1600 calorie meal plan (see media for detailed meal plan)    Assessment Notes:   Insulin instructions given to pt today. She will start on the following insulin regimen per Dr. Denis   Before Bed: 10 units  Pt instructed on how to draw up insulin, site selection and rotation, self injection technique, proper storage of disposal of syringes. Pt demonstrated back self injection technique successfully. Hypoglycemia symptoms and treatment reviewed. Pt informed to place insulin in refrigerator (keep below 88 degrees F, but do not freeze), after opening insulin is good for 30 days. Advised to write opened date & expiration date on vial and discard after 31 days. Instruction booklet given. Contact information provided to call with questions or concerns, as needed.     Pt was given 1 vial of NPH insulin today.  Reminded pt that insulin is only good for 31 days and to discard after that.  Lot # [id number]  Exp date: 10/2023  NDC: [id number]    Plan: Monitoring & Evaluation  Goals:  1. Follow meal plan as outlined above; 3 meals and 3 snacks daily.   2. Check FSBS 4 times a day  (fasting, and 1 hour after each meal)  3. FSBS Goals= Fasting <90; 1 hour PP <130   4. Bring blood sugar log book to each appointment   5. Appropriate weight gain during pregnancy       Follow up 2-4 weeks   Dai Garcia MS, RD, LD  [phone]

## 2021-07-07 ENCOUNTER — ANESTHESIA (OUTPATIENT)
Dept: ANESTHESIOLOGY | Facility: MEDICAL CENTER | Age: 31
End: 2021-07-07

## 2021-07-07 ENCOUNTER — ANESTHESIA EVENT (OUTPATIENT)
Dept: ANESTHESIOLOGY | Facility: MEDICAL CENTER | Age: 31
End: 2021-07-07

## 2021-07-07 ENCOUNTER — HOSPITAL ENCOUNTER (INPATIENT)
Facility: MEDICAL CENTER | Age: 31
LOS: 1 days | End: 2021-07-08
Attending: OBSTETRICS & GYNECOLOGY | Admitting: OBSTETRICS & GYNECOLOGY

## 2021-07-07 LAB
BASOPHILS # BLD AUTO: 0.4 % (ref 0–1.8)
BASOPHILS # BLD: 0.04 K/UL (ref 0–0.12)
CRYSTALS AMN MICRO: NORMAL
EOSINOPHIL # BLD AUTO: 0.23 K/UL (ref 0–0.51)
EOSINOPHIL NFR BLD: 2.1 % (ref 0–6.9)
ERYTHROCYTE [DISTWIDTH] IN BLOOD BY AUTOMATED COUNT: 42.8 FL (ref 35.9–50)
ERYTHROCYTE [DISTWIDTH] IN BLOOD BY AUTOMATED COUNT: 43 FL (ref 35.9–50)
GLUCOSE BLD-MCNC: 110 MG/DL (ref 65–99)
GLUCOSE BLD-MCNC: 85 MG/DL (ref 65–99)
GLUCOSE BLD-MCNC: 86 MG/DL (ref 65–99)
GP B STREP DNA SPEC QL NAA+PROBE: NEGATIVE
HCT VFR BLD AUTO: 34.4 % (ref 37–47)
HCT VFR BLD AUTO: 36.4 % (ref 37–47)
HGB BLD-MCNC: 11.1 G/DL (ref 12–16)
HGB BLD-MCNC: 11.9 G/DL (ref 12–16)
HOLDING TUBE BB 8507: NORMAL
IMM GRANULOCYTES # BLD AUTO: 0.12 K/UL (ref 0–0.11)
IMM GRANULOCYTES NFR BLD AUTO: 1.1 % (ref 0–0.9)
LYMPHOCYTES # BLD AUTO: 2.45 K/UL (ref 1–4.8)
LYMPHOCYTES NFR BLD: 21.9 % (ref 22–41)
MCH RBC QN AUTO: 28 PG (ref 27–33)
MCH RBC QN AUTO: 28.1 PG (ref 27–33)
MCHC RBC AUTO-ENTMCNC: 32.3 G/DL (ref 33.6–35)
MCHC RBC AUTO-ENTMCNC: 32.7 G/DL (ref 33.6–35)
MCV RBC AUTO: 85.8 FL (ref 81.4–97.8)
MCV RBC AUTO: 86.9 FL (ref 81.4–97.8)
MONOCYTES # BLD AUTO: 0.76 K/UL (ref 0–0.85)
MONOCYTES NFR BLD AUTO: 6.8 % (ref 0–13.4)
NEUTROPHILS # BLD AUTO: 7.6 K/UL (ref 2–7.15)
NEUTROPHILS NFR BLD: 67.7 % (ref 44–72)
NRBC # BLD AUTO: 0.02 K/UL
NRBC BLD-RTO: 0.2 /100 WBC
PLATELET # BLD AUTO: 296 K/UL (ref 164–446)
PLATELET # BLD AUTO: 344 K/UL (ref 164–446)
PMV BLD AUTO: 9.7 FL (ref 9–12.9)
PMV BLD AUTO: 9.8 FL (ref 9–12.9)
RBC # BLD AUTO: 3.96 M/UL (ref 4.2–5.4)
RBC # BLD AUTO: 4.24 M/UL (ref 4.2–5.4)
SARS-COV+SARS-COV-2 AG RESP QL IA.RAPID: NOTDETECTED
SPECIMEN SOURCE: NORMAL
WBC # BLD AUTO: 11.2 K/UL (ref 4.8–10.8)
WBC # BLD AUTO: 17.3 K/UL (ref 4.8–10.8)

## 2021-07-07 PROCEDURE — 85025 COMPLETE CBC W/AUTO DIFF WBC: CPT

## 2021-07-07 PROCEDURE — 87081 CULTURE SCREEN ONLY: CPT

## 2021-07-07 PROCEDURE — 59409 OBSTETRICAL CARE: CPT

## 2021-07-07 PROCEDURE — 36415 COLL VENOUS BLD VENIPUNCTURE: CPT

## 2021-07-07 PROCEDURE — 87653 STREP B DNA AMP PROBE: CPT

## 2021-07-07 PROCEDURE — 59410 OBSTETRICAL CARE: CPT | Performed by: PHYSICIAN ASSISTANT

## 2021-07-07 PROCEDURE — 87150 DNA/RNA AMPLIFIED PROBE: CPT

## 2021-07-07 PROCEDURE — 87426 SARSCOV CORONAVIRUS AG IA: CPT

## 2021-07-07 PROCEDURE — 700111 HCHG RX REV CODE 636 W/ 250 OVERRIDE (IP): Performed by: ANESTHESIOLOGY

## 2021-07-07 PROCEDURE — 82962 GLUCOSE BLOOD TEST: CPT | Mod: 91

## 2021-07-07 PROCEDURE — 85027 COMPLETE CBC AUTOMATED: CPT

## 2021-07-07 PROCEDURE — 700101 HCHG RX REV CODE 250: Performed by: ANESTHESIOLOGY

## 2021-07-07 PROCEDURE — 700111 HCHG RX REV CODE 636 W/ 250 OVERRIDE (IP): Performed by: NURSE PRACTITIONER

## 2021-07-07 PROCEDURE — A9270 NON-COVERED ITEM OR SERVICE: HCPCS | Performed by: NURSE PRACTITIONER

## 2021-07-07 PROCEDURE — 89060 EXAM SYNOVIAL FLUID CRYSTALS: CPT

## 2021-07-07 PROCEDURE — 700111 HCHG RX REV CODE 636 W/ 250 OVERRIDE (IP)

## 2021-07-07 PROCEDURE — 770002 HCHG ROOM/CARE - OB PRIVATE (112)

## 2021-07-07 PROCEDURE — 700102 HCHG RX REV CODE 250 W/ 637 OVERRIDE(OP): Performed by: NURSE PRACTITIONER

## 2021-07-07 PROCEDURE — 3E033VJ INTRODUCTION OF OTHER HORMONE INTO PERIPHERAL VEIN, PERCUTANEOUS APPROACH: ICD-10-PCS | Performed by: OBSTETRICS & GYNECOLOGY

## 2021-07-07 PROCEDURE — 303615 HCHG EPIDURAL/SPINAL ANESTHESIA FOR LABOR

## 2021-07-07 PROCEDURE — 304965 HCHG RECOVERY SERVICES

## 2021-07-07 PROCEDURE — 700105 HCHG RX REV CODE 258: Performed by: NURSE PRACTITIONER

## 2021-07-07 RX ORDER — CITRIC ACID/SODIUM CITRATE 334-500MG
30 SOLUTION, ORAL ORAL EVERY 6 HOURS PRN
Status: DISCONTINUED | OUTPATIENT
Start: 2021-07-07 | End: 2021-07-07 | Stop reason: HOSPADM

## 2021-07-07 RX ORDER — SODIUM CHLORIDE, SODIUM LACTATE, POTASSIUM CHLORIDE, CALCIUM CHLORIDE 600; 310; 30; 20 MG/100ML; MG/100ML; MG/100ML; MG/100ML
INJECTION, SOLUTION INTRAVENOUS CONTINUOUS
Status: ACTIVE | OUTPATIENT
Start: 2021-07-07 | End: 2021-07-07

## 2021-07-07 RX ORDER — MISOPROSTOL 200 UG/1
600 TABLET ORAL
Status: DISCONTINUED | OUTPATIENT
Start: 2021-07-07 | End: 2021-07-08 | Stop reason: HOSPADM

## 2021-07-07 RX ORDER — LIDOCAINE HYDROCHLORIDE AND EPINEPHRINE 15; 5 MG/ML; UG/ML
INJECTION, SOLUTION EPIDURAL
Status: COMPLETED | OUTPATIENT
Start: 2021-07-07 | End: 2021-07-07

## 2021-07-07 RX ORDER — BUPIVACAINE HYDROCHLORIDE 2.5 MG/ML
INJECTION, SOLUTION EPIDURAL; INFILTRATION; INTRACAUDAL
Status: COMPLETED
Start: 2021-07-07 | End: 2021-07-07

## 2021-07-07 RX ORDER — METHYLERGONOVINE MALEATE 0.2 MG/ML
0.2 INJECTION INTRAVENOUS
Status: DISCONTINUED | OUTPATIENT
Start: 2021-07-07 | End: 2021-07-08 | Stop reason: HOSPADM

## 2021-07-07 RX ORDER — METHYLERGONOVINE MALEATE 0.2 MG/ML
0.2 INJECTION INTRAVENOUS
Status: DISCONTINUED | OUTPATIENT
Start: 2021-07-07 | End: 2021-07-07 | Stop reason: HOSPADM

## 2021-07-07 RX ORDER — MISOPROSTOL 200 UG/1
800 TABLET ORAL
Status: DISCONTINUED | OUTPATIENT
Start: 2021-07-07 | End: 2021-07-07 | Stop reason: HOSPADM

## 2021-07-07 RX ORDER — ROPIVACAINE HYDROCHLORIDE 2 MG/ML
INJECTION, SOLUTION EPIDURAL; INFILTRATION; PERINEURAL CONTINUOUS
Status: DISCONTINUED | OUTPATIENT
Start: 2021-07-07 | End: 2021-07-08 | Stop reason: HOSPADM

## 2021-07-07 RX ORDER — OXYTOCIN 10 [USP'U]/ML
10 INJECTION, SOLUTION INTRAMUSCULAR; INTRAVENOUS
Status: DISCONTINUED | OUTPATIENT
Start: 2021-07-07 | End: 2021-07-07 | Stop reason: HOSPADM

## 2021-07-07 RX ORDER — SODIUM CHLORIDE, SODIUM LACTATE, POTASSIUM CHLORIDE, CALCIUM CHLORIDE 600; 310; 30; 20 MG/100ML; MG/100ML; MG/100ML; MG/100ML
INJECTION, SOLUTION INTRAVENOUS PRN
Status: DISCONTINUED | OUTPATIENT
Start: 2021-07-07 | End: 2021-07-08 | Stop reason: HOSPADM

## 2021-07-07 RX ORDER — SODIUM CHLORIDE, SODIUM LACTATE, POTASSIUM CHLORIDE, AND CALCIUM CHLORIDE .6; .31; .03; .02 G/100ML; G/100ML; G/100ML; G/100ML
1000 INJECTION, SOLUTION INTRAVENOUS
Status: DISCONTINUED | OUTPATIENT
Start: 2021-07-07 | End: 2021-07-07 | Stop reason: HOSPADM

## 2021-07-07 RX ORDER — DEXTROSE, SODIUM CHLORIDE, SODIUM LACTATE, POTASSIUM CHLORIDE, AND CALCIUM CHLORIDE 5; .6; .31; .03; .02 G/100ML; G/100ML; G/100ML; G/100ML; G/100ML
INJECTION, SOLUTION INTRAVENOUS CONTINUOUS
Status: DISCONTINUED | OUTPATIENT
Start: 2021-07-07 | End: 2021-07-08 | Stop reason: HOSPADM

## 2021-07-07 RX ORDER — IBUPROFEN 600 MG/1
600 TABLET ORAL EVERY 6 HOURS PRN
Status: DISCONTINUED | OUTPATIENT
Start: 2021-07-07 | End: 2021-07-08 | Stop reason: HOSPADM

## 2021-07-07 RX ORDER — TERBUTALINE SULFATE 1 MG/ML
0.25 INJECTION, SOLUTION SUBCUTANEOUS PRN
Status: DISCONTINUED | OUTPATIENT
Start: 2021-07-07 | End: 2021-07-07 | Stop reason: HOSPADM

## 2021-07-07 RX ORDER — BUPIVACAINE HYDROCHLORIDE 2.5 MG/ML
INJECTION, SOLUTION EPIDURAL; INFILTRATION; INTRACAUDAL
Status: COMPLETED | OUTPATIENT
Start: 2021-07-07 | End: 2021-07-07

## 2021-07-07 RX ORDER — CARBOPROST TROMETHAMINE 250 UG/ML
250 INJECTION, SOLUTION INTRAMUSCULAR
Status: DISCONTINUED | OUTPATIENT
Start: 2021-07-07 | End: 2021-07-07 | Stop reason: HOSPADM

## 2021-07-07 RX ORDER — ROPIVACAINE HYDROCHLORIDE 2 MG/ML
INJECTION, SOLUTION EPIDURAL; INFILTRATION; PERINEURAL
Status: COMPLETED
Start: 2021-07-07 | End: 2021-07-07

## 2021-07-07 RX ORDER — METOCLOPRAMIDE HYDROCHLORIDE 5 MG/ML
10 INJECTION INTRAMUSCULAR; INTRAVENOUS EVERY 6 HOURS PRN
Status: DISCONTINUED | OUTPATIENT
Start: 2021-07-07 | End: 2021-07-08 | Stop reason: HOSPADM

## 2021-07-07 RX ORDER — DOCUSATE SODIUM 100 MG/1
100 CAPSULE, LIQUID FILLED ORAL 2 TIMES DAILY PRN
Status: DISCONTINUED | OUTPATIENT
Start: 2021-07-07 | End: 2021-07-08 | Stop reason: HOSPADM

## 2021-07-07 RX ORDER — SODIUM CHLORIDE, SODIUM LACTATE, POTASSIUM CHLORIDE, AND CALCIUM CHLORIDE .6; .31; .03; .02 G/100ML; G/100ML; G/100ML; G/100ML
250 INJECTION, SOLUTION INTRAVENOUS PRN
Status: DISCONTINUED | OUTPATIENT
Start: 2021-07-07 | End: 2021-07-07 | Stop reason: HOSPADM

## 2021-07-07 RX ADMIN — ROPIVACAINE HYDROCHLORIDE 200 MG: 2 INJECTION, SOLUTION EPIDURAL; INFILTRATION at 07:35

## 2021-07-07 RX ADMIN — LIDOCAINE HYDROCHLORIDE,EPINEPHRINE BITARTRATE 3 ML: 15; .005 INJECTION, SOLUTION EPIDURAL; INFILTRATION; INTRACAUDAL; PERINEURAL at 07:28

## 2021-07-07 RX ADMIN — SODIUM CHLORIDE, POTASSIUM CHLORIDE, SODIUM LACTATE AND CALCIUM CHLORIDE: 600; 310; 30; 20 INJECTION, SOLUTION INTRAVENOUS at 04:22

## 2021-07-07 RX ADMIN — SODIUM CHLORIDE 5 MILLION UNITS: 900 INJECTION INTRAVENOUS at 04:23

## 2021-07-07 RX ADMIN — BUPIVACAINE HYDROCHLORIDE 6 ML: 2.5 INJECTION, SOLUTION EPIDURAL; INFILTRATION; INTRACAUDAL; PERINEURAL at 07:28

## 2021-07-07 RX ADMIN — SODIUM CHLORIDE 2.5 MILLION UNITS: 9 INJECTION, SOLUTION INTRAVENOUS at 08:42

## 2021-07-07 RX ADMIN — OXYTOCIN 125 ML/HR: 10 INJECTION, SOLUTION INTRAMUSCULAR; INTRAVENOUS at 12:48

## 2021-07-07 RX ADMIN — IBUPROFEN 600 MG: 600 TABLET, FILM COATED ORAL at 19:54

## 2021-07-07 RX ADMIN — ROPIVACAINE HYDROCHLORIDE 200 MG: 2 INJECTION, SOLUTION EPIDURAL; INFILTRATION; PERINEURAL at 07:35

## 2021-07-07 RX ADMIN — OXYTOCIN 2 MILLI-UNITS/MIN: 10 INJECTION, SOLUTION INTRAMUSCULAR; INTRAVENOUS at 04:35

## 2021-07-07 RX ADMIN — FENTANYL CITRATE 100 MCG: 50 INJECTION, SOLUTION INTRAMUSCULAR; INTRAVENOUS at 07:28

## 2021-07-07 RX ADMIN — OXYTOCIN 2000 ML/HR: 10 INJECTION, SOLUTION INTRAMUSCULAR; INTRAVENOUS at 11:30

## 2021-07-07 RX ADMIN — FENTANYL CITRATE 100 MCG: 50 INJECTION, SOLUTION INTRAMUSCULAR; INTRAVENOUS at 07:21

## 2021-07-07 ASSESSMENT — PATIENT HEALTH QUESTIONNAIRE - PHQ9
1. LITTLE INTEREST OR PLEASURE IN DOING THINGS: NOT AT ALL
SUM OF ALL RESPONSES TO PHQ9 QUESTIONS 1 AND 2: 0
2. FEELING DOWN, DEPRESSED, IRRITABLE, OR HOPELESS: NOT AT ALL

## 2021-07-07 ASSESSMENT — PAIN DESCRIPTION - PAIN TYPE: TYPE: ACUTE PAIN

## 2021-07-07 ASSESSMENT — PAIN SCALES - GENERAL: PAIN_LEVEL: 0

## 2021-07-07 NOTE — CARE PLAN
The patient is Stable - Low risk of patient condition declining or worsening         Progress made toward(s) clinical / shift goals: Healthy mom, healthy baby.     Patient is not progressing towards the following goals: N/A       Problem: Knowledge Deficit - L&D  Goal: Patient and family/caregivers will demonstrate understanding of plan of care, disease process/condition, diagnostic tests and medications  Outcome: Progressing   POC discussed. Pt verbalized understanding.   Problem: Risk for Venous Thromboembolism (VTE)  Goal: VTE prevention measures will be implemented and patient will remain free from VTE  Outcome: Progressing   Pt ambulatory to self.   Problem: Pain  Goal: Patient's pain will be alleviated or reduced to the patient’s comfort goal  Outcome: Progressing   Pt will ask for epidural when ready.

## 2021-07-07 NOTE — PROGRESS NOTES
0400- Received report from OLIVIA Castellon. POC discussed.     0415- POC discussed with Dr. Mejia. Received orders to start pitocin 2 x 2; please see MAR.     0620- Pt requesting epidural at this time. LR bolus initiated.     0700- Report given to OLIVIA Gonzalez. POC discussed.

## 2021-07-07 NOTE — ANESTHESIA PROCEDURE NOTES
Epidural Block    Date/Time: 7/7/2021 7:28 AM  Performed by: Josephine Hatfield M.D.  Authorized by: Josephine Hatfield M.D.     Patient Location:  OB  Start Time:  7/7/2021 7:28 AM  End Time:  7/7/2021 7:33 AM  Reason for Block: labor analgesia    patient identified, IV checked, site marked, risks and benefits discussed, surgical consent, monitors and equipment checked, pre-op evaluation and timeout performed    Patient Position:  Sitting  Prep: ChloraPrep, patient draped and sterile technique    Monitoring:  Blood pressure, continuous pulse oximetry and heart rate  Approach:  Midline  Location:  L2-L3  Injection Technique:  DAVID saline  Skin infiltration:  Lidocaine  Strength:  1%  Dose:  3ml  Needle Type:  Tuohy  Needle Gauge:  17 G  Needle Length:  3.5 in  Loss of resistance::  3.5  Catheter Size:  19 G  Catheter at Skin Depth:  9  Test Dose Result:  Negative   Single pass, uneventful placement. Pt tolerated procedure well.

## 2021-07-07 NOTE — ANESTHESIA PREPROCEDURE EVALUATION
29 yo  at 34-5 echevarria IUP requesting epidural for labor    Relevant Problems   OB   (positive) GDM, class A2      Other   (positive) Uterine size-date discrepancy in third trimester       Physical Exam    Airway   Mallampati: II  TM distance: >3 FB  Neck ROM: full       Cardiovascular - normal exam  Rhythm: regular  Rate: normal  (-) murmur     Dental - normal exam           Pulmonary - normal exam  Breath sounds clear to auscultation     Abdominal    Neurological - normal exam                 Anesthesia Plan    ASA 2       Plan - epidural   Neuraxial block will be labor analgesia                  Pertinent diagnostic labs and testing reviewed    Informed Consent:    Anesthetic plan and risks discussed with patient.

## 2021-07-07 NOTE — ANESTHESIA TIME REPORT
Anesthesia Start and Stop Event Times     Date Time Event    7/7/2021 0718 Ready for Procedure     0721 Anesthesia Start     1127 Anesthesia Stop        Responsible Staff  07/07/21    Name Role Begin End    Josephine Hatfield M.D. Anesth 0721 1127        Preop Diagnosis (Free Text):  Pre-op Diagnosis             Preop Diagnosis (Codes):    Post op Diagnosis  Pregnancy      Premium Reason  Non-Premium    Comments:

## 2021-07-07 NOTE — ANESTHESIA POSTPROCEDURE EVALUATION
Patient: Adalid Cortez    Procedure Summary     Date: 07/07/21 Room / Location:     Anesthesia Start: 0721 Anesthesia Stop: 1127    Procedure: Labor Epidural Diagnosis:     Scheduled Providers:  Responsible Provider: Josephine Hatfield M.D.    Anesthesia Type: epidural ASA Status: 2          Final Anesthesia Type: epidural  Last vitals  BP   Blood Pressure: 106/63    Temp   36.5 °C (97.7 °F)    Pulse   70   Resp   16    SpO2   97 %      Anesthesia Post Evaluation    Patient location during evaluation: bedside  Patient participation: complete - patient participated  Level of consciousness: awake and alert  Pain score: 0    Airway patency: patent  Anesthetic complications: no  Cardiovascular status: hemodynamically stable  Respiratory status: acceptable  Hydration status: euvolemic    PONV: none          No complications documented.

## 2021-07-07 NOTE — PROGRESS NOTES
0700- Bedside report received from Shayna RN- poc discussed  0725- timeout done with Dr. Hatfield  0732- epidural placed  0800- rodriguez placed, sve 5-6/100/-1, pt very comfortable will let me know if she feels pressure  0945- sve 9/100/0  1100- pt complete, pushing started  1127-  viable female infant apgar 8/9  1410- pt up to the bathroom no issues  1430- report given to Reece RN- poc discussed

## 2021-07-07 NOTE — PROGRESS NOTES
0248 Patient is a  at 34weeks and 5days, EDC of . Arrived to unit and placed in LDA 1, RN placed EFM and toco to ensure fetal well being and monitor uterine activity. RN educated patient on need for monitors and patient verbalized understanding. Vital signs taken and within normal limits. Prenatal labs / records reviewed by RN. Patient's chief complaint srom at 0200.  Patient denies vaginal bleeding, reports positive fetal movement.   0300 havez notified of patient arrival, report given, chief complaint is srom 0200 clear fluid,  & para reviewed, bps, fhr, orders received for fern and sve.   0305 Fern collected, sve.   0330 Admission orders received.   0340 IV started, labs sent.   0342 GBS collected and sent.   0358 bedside report given to Shayna BAKER, assumed care, POC discussed, all questions answered; vital signs stable.

## 2021-07-07 NOTE — H&P
History and Physical      Adalid Cortez is a 30 y.o. year old female  at 34w5d who presents for LOF at 0200  Her pregnancy has been complicated by GDM A2 and SGA    Subjective:   positive  For CTXS.   positive Feels pain   positive for LOF  negative for vaginal bleeding.   positive for fetal movement    ROS: Pertinent items are noted in HPI.    No past medical history on file.  No past surgical history on file.  OB History    Para Term  AB Living   1             SAB TAB Ectopic Molar Multiple Live Births                    # Outcome Date GA Lbr Justus/2nd Weight Sex Delivery Anes PTL Lv   1 Current              Social History     Socioeconomic History   • Marital status: Single     Spouse name: Not on file   • Number of children: Not on file   • Years of education: Not on file   • Highest education level: Not on file   Occupational History   • Not on file   Tobacco Use   • Smoking status: Never Smoker   • Smokeless tobacco: Never Used   Vaping Use   • Vaping Use: Former   Substance and Sexual Activity   • Alcohol use: Not Currently   • Drug use: Not Currently   • Sexual activity: Yes     Partners: Male     Birth control/protection: None     Comment: None    Other Topics Concern   • Not on file   Social History Narrative   • Not on file     Social Determinants of Health     Financial Resource Strain:    • Difficulty of Paying Living Expenses:    Food Insecurity:    • Worried About Running Out of Food in the Last Year:    • Ran Out of Food in the Last Year:    Transportation Needs:    • Lack of Transportation (Medical):    • Lack of Transportation (Non-Medical):    Physical Activity:    • Days of Exercise per Week:    • Minutes of Exercise per Session:    Stress:    • Feeling of Stress :    Social Connections:    • Frequency of Communication with Friends and Family:    • Frequency of Social Gatherings with Friends and Family:    • Attends Hoah[employer] Services:    • Active Member of Clubs  "or Organizations:    • Attends Club or Organization Meetings:    • Marital Status:    Intimate Partner Violence:    • Fear of Current or Ex-Partner:    • Emotionally Abused:    • Physically Abused:    • Sexually Abused:      Allergies: Sulfa drugs    Current Facility-Administered Medications:   •  LR infusion, , Intravenous, Continuous, Valeri Cooley, A.P.R.N.  •  D5LR infusion, , Intravenous, Continuous, Valeri Cooley, A.P.R.N.  •  fentaNYL (SUBLIMAZE) injection 50 mcg, 50 mcg, Intravenous, Q HOUR PRN, Valeri Cooley, A.P.R.N.  •  fentaNYL (SUBLIMAZE) injection 100 mcg, 100 mcg, Intravenous, Q HOUR PRN, Valeri Cooley, A.P.R.N.  •  terbutaline (BRETHINE) injection 0.25 mg, 0.25 mg, Intravenous, PRN, Valeri Cooley, A.P.R.N.  •  Na citrate-citric acid (BICITRA) 500-334 MG/5ML solution 30 mL, 30 mL, Oral, Q6HRS PRN, Valeri Cooley, A.P.R.N.  •  penicillin G potassium 5 Million Units in  mL IVPB, 5 Million Units, Intravenous, Once **FOLLOWED BY** penicillin G potassium 2.5 Million Units in  mL IVPB, 2.5 Million Units, Intravenous, Q4HRS, Valeri Cooley, A.P.R.N.  •  oxytocin (PITOCIN) injection 10 Units, 10 Units, Intramuscular, Once PRN, Valeri Cooley, A.P.R.N.  •  miSOPROStol (CYTOTEC) tablet 800 mcg, 800 mcg, Rectal, Once PRN, Valeri Cooley, A.P.R.N.  •  methylergonovine (METHERGINE) injection 0.2 mg, 0.2 mg, Intramuscular, Once PRN, Valeri Cooley, A.P.R.N.  •  carboPROST (HEMABATE) injection 250 mcg, 250 mcg, Intramuscular, Once PRN, Valeri Cooley, A.P.R.N.    Prenatal care with [facility] starting at 15w5d with following problems:  Patient Active Problem List    Diagnosis Date Noted   • Encounter for supervision of normal first pregnancy in third trimester 07/01/2021   • GDM, class A2 07/01/2021   • Uterine size-date discrepancy in third trimester 06/24/2021   • Supervision of first pregnancy  03/22/2021         Objective:      /69   Pulse 87   Resp 16   Ht 1.549 m (5' 1\")   Wt 46.9 kg (103 " lb 8 oz)   SpO2 96%     General:   no acute distress   Skin:   normal   HEENT:  PERRLA   Lungs:   CTA bilateral   Heart:   S1, S2 normal, no murmur, click, rub or gallop, regular rate and rhythm, brisk carotid upstroke without bruits, peripheral pulses very brisk, chest is clear without rales or wheezing, no pedal edema, no JVD, no hepatosplenomegaly   Abdomen:   gravid, NT   EFW:  4167-1315 g   Pelvis:  Exam deferred., proven to 0 g   FHTs: 115 BPM + accels - decels moderate variability   Contractions: 3 contractions in 10 min moderate to palpation   Uterine Size: S<D   Presentations: Cephalic per RN   Cervix: Per RN    Dilation: 3cm    Effacement: 80%    Station:  -2    Consistency: Soft    Position: Middle     Complete OB US  6/24/2021 4:56 PM     HISTORY/REASON FOR EXAM:  Size less than dates     TECHNIQUE/EXAM DESCRIPTION: OB limited ultrasound.     COMPARISON:  Obstetrical ultrasound 3/31/2021     FINDINGS:  Fetal Lie:  Vertex  LMP:  11/06/2020  Clinical FALGUNI by LMP:  08/13/2021     Placenta (Location):  Posterior fundal  Placenta Previa: No  Placental Grade: I-II     Amniotic Fluid Volume:  HERNANDEZ = 13.28 cm     Fetal Heart Rate:  120 bpm     No maternal adnexal mass is identified.     SD ratios are normal at 2.5, 2.6 and 2.1.     Fetal Biometry  BPD    7.86 cm, 31 weeks, 4 days, (11 Percent)  HC    29.26 cm, 32 weeks, 2 days, (7 Percent)  AC    27.92 cm, 32 weeks, 0 days, (25 Percent)  Femur Length    6.00 cm, 31 weeks, 2 days, (7 Percent)  Humerus Length    5.33 cm, 31 weeks, 1 days, (20 Percent)     EGA by this US:  31 weeks, 6 days  FALGUNI by this US: 08/20/2021  FALGUNI by 1st US:  8/14/2021     Estimated Fetal Weight:  1828 grams  EFW Percentile: 13 Percent     Comments:  The ultrasound dates are approximately 1 week less than the clinical dates.     IMPRESSION:     1.  Single intrauterine pregnancy of an estimated gestational age of 31 weeks, 6 days with an estimated date of delivery of 08/20/2021.     Lab  Review  Lab:   Blood type: B     Recent Results (from the past 5880 hour(s))   THINPREP PAP W/HPV AND CTNG    Collection Time: 02/24/21 11:22 AM   Result Value Ref Range    Cytology Reg See Path Report     Source Cervical     HPV Genotype 16 Negative Negative    HPV Genotype 18 Negative Negative    HPV Other High Risk Genotypes Negative Negative    C. trachomatis by PCR Negative Negative    N. gonorrhoeae by PCR Negative Negative    Source Cervical    POCT Urinalysis    Collection Time: 02/26/21 10:44 PM   Result Value Ref Range    POC Color yellow Negative    POC Appearance clear Negative    POC Leukocyte Esterase large Negative    POC Nitrites negative Negative    POC Urobiligen 1.0 Negative (0.2) mg/dL    POC Protein begative Negative mg/dL    POC Urine PH 7.0 5.0 - 8.0    POC Blood small Negative    POC Specific Gravity 1.010 <1.005 - >1.030    POC Ketones 40 Negative mg/dL    POC Bilirubin negative Negative mg/dL    POC Glucose negative Negative mg/dL   POCT PREGNANCY    Collection Time: 02/26/21 10:45 PM   Result Value Ref Range    POC Urine Pregnancy Test Positive Negative    Internal Control Positive Positive     Internal Control Negative Negative    Urine Culture    Collection Time: 02/27/21 10:55 AM    Specimen: Urine   Result Value Ref Range    Significant Indicator POS (POS)     Source UR     Site -     Culture Result - (A)     Culture Result Escherichia coli  >100,000 cfu/mL   (A)        Susceptibility    Escherichia coli - JUDITH     Ampicillin >16 Resistant mcg/mL     Ceftriaxone <=1 Sensitive mcg/mL     Ceftazidime <=1 Sensitive mcg/mL     Cefotaxime <=2 Sensitive mcg/mL     Cefazolin <=2 Sensitive mcg/mL     Ciprofloxacin <=0.25 Sensitive mcg/mL     Cefepime <=2 Sensitive mcg/mL     Cefuroxime <=4 Sensitive mcg/mL     Ampicillin/sulbactam 16/8 Intermediate mcg/mL     Tobramycin <=2 Sensitive mcg/mL     Nitrofurantoin <=32 Sensitive mcg/mL     Gentamicin <=2 Sensitive mcg/mL     Levofloxacin <=0.5  Sensitive mcg/mL     Pip/Tazobactam <=8 Sensitive mcg/mL     Trimeth/Sulfa >2/38 Resistant mcg/mL     Tigecycline <=2 Sensitive mcg/mL   ABO AND RH DETERMINATION    Collection Time: 03/08/21  3:47 PM   Result Value Ref Range    ABO Grouping Only B     Rh Grouping Only POS    AFP TETRA    Collection Time: 03/08/21  5:45 PM   Result Value Ref Range    AFP Value -Eia 55 ng/mL    AFP MOM Value 0.89     Ue3 Value 2.39 ng/mL    Ue3 Mom 1.05     Patient's hCG, 2nd Trimester 16375 IU/L    hCG MoM, 2nd Trimester 0.79     Brie Value -Eia 162 pg/mL    Brie Mom Value 0.95     Interpretation Screen Neg     M[age]UNI 30.9 yr    Maternal Weight 101.0 lbs.     Gest. Age on Collection Date 18 wks, 6 days     Gestational Age Based On Ultrasound     Multiple Pregnancy Garcia     Race Nonblack     Insulin Dependent Diabetes No     Smoking No     Family Hx NTD No     Family Hx of Aneuploidy No     Specimen See Note     EER Quad, Maternal Serum See Note    HEP C VIRUS ANTIBODY    Collection Time: 03/08/21  5:45 PM   Result Value Ref Range    Hepatitis C Antibody Non-Reactive Non-Reactive   URINE DRUG SCREEN W/CONF (AR)    Collection Time: 03/08/21  5:46 PM   Result Value Ref Range    Urine Amphetamine-Methamphetam Negative Cutoff 300 ng/mL    Barbiturates Negative Cutoff 200 ng/mL    Benzodiazepines Negative Cutoff 200 ng/mL    Propoxyphene Negative Cutoff 300 ng/mL    Cocaine Metabolite Negative Cutoff 150 ng/mL    Methadone Negative Cutoff 150 ng/mL    Codeine-Morphine Negative Cutoff 300 ng/mL    Phencyclidine -Pcp Negative Cutoff 25 ng/mL    Cannabinoid Metab Negative Cutoff 20 ng/mL    Drug Comment Urine Drugs See Note    URINALYSIS,CULTURE IF INDICATED    Collection Time: 04/07/21 12:10 PM    Specimen: Urine   Result Value Ref Range    Color Yellow     Character Clear     Specific Gravity 1.021 <1.035    Ph 7.0 5.0 - 8.0    Glucose 250 (A) Negative mg/dL    Ketones Negative Negative mg/dL    Protein Negative Negative  mg/dL    Bilirubin Negative Negative    Urobilinogen, Urine 1.0 Negative    Nitrite Negative Negative    Leukocyte Esterase Trace (A) Negative    Occult Blood Negative Negative    Micro Urine Req Microscopic    URINE MICROSCOPIC (W/UA)    Collection Time: 04/07/21 12:10 PM   Result Value Ref Range    WBC 5-10 (A) /hpf    RBC 0-2 /hpf    Bacteria Few (A) None /hpf    Epithelial Cells Few /hpf    Hyaline Cast 3-5 (A) /lpf   T.PALLIDUM AB EIA    Collection Time: 05/10/21  3:15 PM   Result Value Ref Range    Syphilis, Treponemal Qual Non-Reactive Non-Reactive   CBC WITHOUT DIFFERENTIAL    Collection Time: 05/10/21  3:15 PM   Result Value Ref Range    WBC 14.6 (H) 4.8 - 10.8 K/uL    RBC 3.70 (L) 4.20 - 5.40 M/uL    Hemoglobin 11.2 (L) 12.0 - 16.0 g/dL    Hematocrit 34.3 (L) 37.0 - 47.0 %    MCV 92.7 81.4 - 97.8 fL    MCH 30.3 27.0 - 33.0 pg    MCHC 32.7 (L) 33.6 - 35.0 g/dL    RDW 44.1 35.9 - 50.0 fL    Platelet Count 407 164 - 446 K/uL    MPV 8.5 (L) 9.0 - 12.9 fL   GLUCOSE 1HR GESTATIONAL    Collection Time: 05/10/21  3:15 PM   Result Value Ref Range    Glucose, Post Dose 184 (H) 70 - 139 mg/dL   GTT  (GESTATIONAL GLUCOSE 3 HR)    Collection Time: 06/02/21  9:28 AM   Result Value Ref Range    Baseline Glucose 107 (H) 65 - 95 mg/dL    Glucose 1 Hour 269 (H) 65 - 180 mg/dL    Glucose 2 Hour 272 (H) 65 - 155 mg/dL    Glucose 3 Hour 238 (H) 65 - 140 mg/dL   HEMOGLOBIN A1C    Collection Time: 06/07/21 11:02 AM   Result Value Ref Range    Glycohemoglobin 5.5 4.0 - 5.6 %    Est Avg Glucose 111 mg/dL   POCT Urinalysis    Collection Time: 07/01/21 11:17 AM   Result Value Ref Range    POC Color yellow Negative    POC Appearance cloudy Negative    POC Leukocyte Esterase large Negative    POC Nitrites negative Negative    POC Urobiligen n/a Negative (0.2) mg/dL    POC Protein negative Negative mg/dL    POC Urine PH 5.5 5.0 - 8.0    POC Blood trace Negative    POC Specific Gravity 1.010 <1.005 - >1.030    POC Ketones negative  Negative mg/dL    POC Bilirubin n/a Negative mg/dL    POC Glucose negative Negative mg/dL   FERN TEST    Collection Time: 07/07/21  3:05 AM   Result Value Ref Range    Fern Test On Amniotic Fluid see below Not present        Assessment:   1.  IUP at 34w5d  2.  Labor status: Not in labor. PPROM  3.  Cat 1 FHTs  4.  Obstetrical history significant for   Patient Active Problem List    Diagnosis Date Noted   • Encounter for supervision of normal first pregnancy in third trimester 07/01/2021   • GDM, class A2 07/01/2021   • Uterine size-date discrepancy in third trimester 06/24/2021   • Supervision of first pregnancy  03/22/2021   .      Plan:     Admit to L&D  GBS unknown, begin PCN  Send GBS  Routine labs  Pain management prn    Valeri Cooley CNM, APRN

## 2021-07-07 NOTE — ED PROVIDER NOTES
"Emergency Obstetric Consultation       PATIENT ID:  NAME:  Adalid Cortez  MRN:               [id number]  YOB: 1990     30 y.o. female  at 34w5d.    Subjective: Pt reports LOF at 0200  Pregnancy complicated by SGA, GDM A2    positive  For CTXS.   positive Feels pain   positive for LOF  negative for vaginal bleeding.   positive for fetal movement    ROS: Patient denies any fever chills, nausea, vomiting, headache, chest pain, shortness of breath, or dysuria or unusual swelling of hands or feet.     Objective:    Vitals:    21 0252   BP: 108/69   Pulse: 87   Resp: 16   SpO2: 96%   Weight: 46.9 kg (103 lb 8 oz)   Height: 1.549 m (5' 1\")     No data recorded.    General: No acute distress, resting comfortably in bed.  HEENT: normocephalic, nontraumatic, PERRLA, EOMI  Cardiovascular: Heart RRR with no murmurs, rubs or gallops. Distal Pulses 2+  Respiratory: symmetric chest expansion, lungs CTAB, with no wheezes, rales, rhonci  Abdomen: gravid, nontender  Musculoskeletal: strength 5/5 in four extremities  Neuro: non focal with no numbness, tingling or changes in sensation    Cervix:  3cm/80%/-2  [place] Bar: Uterine Contractions Q 4 minutes.   FHRM: Baseline 115, Accels to 150, no decels, moderate variability    Labs:     Assessment: 30 y.o. female  at 34w5d.    Plan:   Admit for PPROM  GBS  Start abx    PETAR Mcdaniel  "

## 2021-07-08 VITALS
SYSTOLIC BLOOD PRESSURE: 101 MMHG | HEIGHT: 61 IN | RESPIRATION RATE: 16 BRPM | HEART RATE: 61 BPM | TEMPERATURE: 97.9 F | DIASTOLIC BLOOD PRESSURE: 65 MMHG | OXYGEN SATURATION: 97 % | WEIGHT: 103.5 LBS | BODY MASS INDEX: 19.54 KG/M2

## 2021-07-08 LAB — GLUCOSE BLD-MCNC: 75 MG/DL (ref 65–99)

## 2021-07-08 PROCEDURE — A9270 NON-COVERED ITEM OR SERVICE: HCPCS | Performed by: NURSE PRACTITIONER

## 2021-07-08 PROCEDURE — 700102 HCHG RX REV CODE 250 W/ 637 OVERRIDE(OP): Performed by: NURSE PRACTITIONER

## 2021-07-08 PROCEDURE — 82962 GLUCOSE BLOOD TEST: CPT

## 2021-07-08 RX ADMIN — IBUPROFEN 600 MG: 600 TABLET, FILM COATED ORAL at 06:25

## 2021-07-08 RX ADMIN — IBUPROFEN 600 MG: 600 TABLET, FILM COATED ORAL at 13:12

## 2021-07-08 ASSESSMENT — PAIN DESCRIPTION - PAIN TYPE: TYPE: ACUTE PAIN

## 2021-07-08 NOTE — PROGRESS NOTES
1900: Received report from OLIVIA Newell.     1915: Patient assessed. Reports pain, medicated see MAR. Patient states she will call when needing pain medication. New tray ordered for dinner, encouraged to call 1 hr after finishing meal to check sugar. Whiteboards updated, POC discussed. Patient encouraged to pump every 3 hrs. Call light within reach. Patient encouraged to call with any needs and or concerns.

## 2021-07-08 NOTE — CARE PLAN
The patient is Stable - Low risk of patient condition declining or worsening    Shift Goals  Clinical Goals: Normal lochia, pain WDL   Patient Goals:     Progress made toward(s) clinical / shift goals: Fundus firm with light lochia. Patient educated on when to pull emergency call light. Motrin x 1 for cramping given.     Patient is not progressing towards the following goals:

## 2021-07-08 NOTE — L&D DELIVERY NOTE
DATE OF SERVICE:  2021     On 2021 at 11:27 a.m., this 30-year-old  1, para 0,  female   with an intrauterine pregnancy at 34 weeks and 5/7 days under epidural   anesthesia delivered a viable male infant with Apgar scores of 7 and 9.    Weight is 4 pounds 2 ounces or 1888 grams.  The patient was admitted due to a    premature rupture of membranes at 2:00 a.m. today, was found to be in   early labor.  Pregnancy was complicated by gestational diabetes, type A2.  GBS   was unknown and so the patient received 2 doses of penicillin prior to some   testing, but a rapid GBS was found to be negative.  The patient also received   an epidural during labor and I think I mentioned already, but received some   Pitocin for augmentation.  Delivery was via normal spontaneous vaginal   delivery to a sterile field in an occiput anterior position.  Loose nuchal   cord x1 was reduced after delivery of the infant.  Infant was placed on   maternal abdomen with the waiting RN.  The NICU team and respiratory therapy   was called for delivery.  After 30 seconds of delayed cord clamping, cord was   clamped by myself and cut by the father of the baby.  The infant was walked to   the warmer with the waiting RNs.  A segment of cord gases was obtained and,   unfortunately only venous was able to be collected and the venous pH was 7.33   with a base excess of -4.  An intact placenta with a 3-vessel cord delivered   spontaneously at 11:35.  Pitocin was then started wide open in the IV.  The   patient had excellent hemostasis.  Fundus was found to be firm.  Vagina was   explored.  No lacerations were noted.  Also, the patient's blood sugar was   noted to be 86 upon admission.  Infant was taken to the  nursery/NICU   for evaluation, but was doing well.  EBL 300cc. Dr. Reyes is the attending.        ______________________________  ALISA Stoll        ______________________________  Aftab Reyes,  MD ISRAEL/KOM/ZAHRAA    DD:  07/07/2021 13:36  DT:  07/07/2021 17:57    Job#:  [id number]

## 2021-07-08 NOTE — DISCHARGE PLANNING
Discharge Planning Assessment Post Partum    Reason for Referral: NICU admission  Address: [address] [place] [address]  Type of Living Situation: Apartment, lives with spouse  Mom Diagnosis: Pregnancy 34w6d  Baby Diagnosis: [place]  Primary Language: English    Name of Baby: Yrn  Father of the Baby: Mart Christy  Involved in baby’s care? Yes  Contact Information: [phone]    Prenatal Care: [facility], starting 15w  Mom's PCP: None  PCP for new baby: None, notified of baby requiring PCP with appointment within 24-48 hours of discharge, recommended they look into [facility], stated that they don't have insurance but that spouse's insurance will roll over next month    Support System: Spouse, family lives in [place], coming to visit  Coping/Bonding between mother & baby: Unknown,   Source of Feeding: Breastfeeding  Supplies for Infant: Yes    Mom's Insurance: None  Baby Covered on Insurance: Uninsured  Mother Employed/School: Unkown  Other children in the home/names & ages: None     Financial Hardship/Income: Denied  Mom's Mental status: Alert and oriented, somewhat flat affect   Services used prior to admit: Unknown    CPS History: Unknown  Psychiatric History: Denied   Domestic Violence History: Unknown  Drug/ETOH History: Unknown    Resources Provided: Postpartum support  Referrals Made: None     Clearance for Discharge: Baby is cleared to discharge with MOB and FOB when medically cleared     Ongoing Plan: LSW to assist as needed

## 2021-07-08 NOTE — DISCHARGE SUMMARY
Discharge Summary:     Date of Admission: 2021  Date of Discharge: 21      Admitting diagnosis:    1. Pregnancy @ 34w6d   2. PPROM      Discharge Diagnosis:   1. Status post vaginal, spontaneous.  2. PPROM    No past medical history on file.  OB History    Para Term  AB Living   1             SAB TAB Ectopic Molar Multiple Live Births                    # Outcome Date GA Lbr Justus/2nd Weight Sex Delivery Anes PTL Lv   1 Current              No past surgical history on file.  Sulfa drugs    Patient Active Problem List   Diagnosis   • Supervision of first pregnancy    • Uterine size-date discrepancy in third trimester   • Encounter for supervision of normal first pregnancy in third trimester   • GDM, class A2       Hospital Course:   30 y.o. now , was admitted for PPROM at 34&5, underwent Induction of Labor, vaginal, spontaneous. Pt gave birth to baby girl with APGARs of 7/9 and weight 1888g.  Patient's postpartum course was unremarkable, with progressive advancement in diet , ambulation and toleration of oral analgesia. Patient without complaints today and desires discharge.      Physical Exam:  Temp:  [36.3 °C (97.3 °F)-37.1 °C (98.8 °F)] 36.6 °C (97.9 °F)  Pulse:  [] 61  Resp:  [16] 16  BP: ()/(50-86) 101/65  SpO2:  [94 %-100 %] 97 %  Physical Exam  General: well  Chest/Breasts: nipples intact   Abdomen: no masses, no tender, soft.  Fundus: firm, below umbilicus and nontender  Incision: not applicable, (vaginal delivery)  Perineum: deferred  Extremities: no edema, calves nontender    Current Facility-Administered Medications   Medication Dose   • D5LR infusion     • metoclopramide (REGLAN) injection 10 mg  10 mg   • oxytocin (PITOCIN) infusion (for postpartum)   mL/hr   • ibuprofen (MOTRIN) tablet 600 mg  600 mg   • oxytocin (PITOCIN) 20 UNITS/1000ML LR (induction of labor)  0.5-20 bjorn-units/min   • ropivacaine 0.2 % (NAROPIN) injection     • LR infusion     • PRN  oxytocin (PITOCIN) (20 Units/1000 mL) PRN for excessive uterine bleeding - See Admin Instr  125-999 mL/hr   • miSOPROStol (CYTOTEC) tablet 600 mcg  600 mcg   • methylergonovine (METHERGINE) injection 0.2 mg  0.2 mg   • docusate sodium (COLACE) capsule 100 mg  100 mg       Recent Labs     21  0340 21  1959   WBC 11.2* 17.3*   RBC 3.96* 4.24   HEMOGLOBIN 11.1* 11.9*   HEMATOCRIT 34.4* 36.4*   MCV 86.9 85.8   MCH 28.0 28.1   MCHC 32.3* 32.7*   RDW 43.0 42.8   PLATELETCT 344 296   MPV 9.8 9.7         Activity/ Discharge Instructions::   Discharge to home  Pelvic Rest x 6 weeks  No heavy lifting x4 weeks  Call or come to ED for: heavy vaginal bleeding, fever >100.4, severe abdominal pain, severe headache, chest pain, shortness of breath,  N/V, incisional drainage, or other concerns.       Follow up:  [facility] in 5 weeks for vaginal delivery.    Discharge Meds:   No current outpatient medications on file.         A/P:    Adalid Cortez is a  29 yo who reported to clinic 2021 for PPROM and induction of labor at 34&5. The female child was delivered without complication and is currently being treated in the NICU due to prematurity. The mother is feeling well and states she is ready to be discharged home. The mother and father were consulted about their options and decided they would like to be discharged today.    Plan  DC home

## 2021-07-08 NOTE — PROGRESS NOTES
1445- Pt received from l&d via w/c to room S-341.  Report received at bedside, assumed care of patient.  Pt alert and oriented, showing no s/s of distress.  Denies pain at this time.  Pt assessment complete, wnl.  Instructed to call for assist for first time oob.  Pt oriented to room, routine, security, and call/emergency light.  POC discussed.  FOB at bedside and supportive.  Pt encouraged to call with needs, will monitor.     1530- Pt up to bathroom, now steady on feet.  Voided without difficulty.  Mariela care done and taught.    1600- Pt to NICU via w/c with FOB to visit infant, stable condition.    1630- Pt given breast pump kit and instructed on breast pump usage. Pt comfortable at 30% suction, taught to decrease speed from 80 to 60 after 2 minutes, and pump for 15 mins total.  Instructions given on pump parts cleaning as well.    1730- IV infiltrated, removed.  Notified ALISA Rubio of patient's IV infiltrating.  OK to leave IV out and monitor bleeding.

## 2021-07-08 NOTE — CARE PLAN
Problem: Pain  Goal: Patient's pain will be alleviated or reduced to the patient’s comfort goal  Outcome: Progressing     Problem: Discharge Barriers/Planning  Goal: Patient's continuum of care needs are met  Outcome: Progressing     Problem: Knowledge Deficit - Standard  Goal: Patient and family/care givers will demonstrate understanding of plan of care, disease process/condition, diagnostic tests and medications  Outcome: Progressing   The patient is Stable - Low risk of patient condition declining or worsening    Shift Goals  Clinical Goals: Normal lochia, pain WDL   Patient Goals: visit infant in NICU, pump breasts    Progress made toward(s) clinical / shift goals:      Patient is not progressing towards the following goals:

## 2021-07-08 NOTE — DISCHARGE INSTRUCTIONS
PATIENT DISCHARGE EDUCATION INSTRUCTION SHEET  REASONS TO CALL YOUR PEDIATRICIAN  · Projectile or forceful vomiting for more than one feeding  · Unusual rash lasting more than 24 hours  · Very sleepy, difficult to wake up  · Bright yellow or pumpkin colored skin with extreme sleepiness  · Temperature below 97.6 or above 100.4 F rectally  · Feeding problems  · Breathing problems  · Excessive crying with no known cause  · If cord starts to become red, swollen, develops a smell or discharge  · No wet diaper or stool in a 24 hour time period     REASONS TO CALL YOUR OBSTETRICIAN  · Persistent fever, shaking, chills (Temperature higher than 100.4) may indicate you have an infection  · Heavy bleeding: soaking more than 1 pad per hour; Passing clots an egg-sized clot or bigger may mean you have an postpartum hemorrhage  · Foul odor from vagina or bad smelling or discolored discharge or blood  · Breast infection (Mastitis symptoms); breast pain, chills, fever, redness or red streaks, may feel flu like symptoms  · Urinary pain, burning or frequency  · Incision that is not healing, increased redness, swelling, tenderness or pain, or any pus from episiotomy or  site may mean you have an infection  · Redness, swelling, warmth, or painful to touch in the calf area of your leg may mean you have a blood clot  · Severe or intensified depression, thoughts or feelings of wanting to hurt yourself or someone else   · Pain in chest, obstructed breathing or shortness of breath (trouble catching your breath) may mean you are having a postpartum complication. Call your provider immediately   · Headache that does not get better, even after taking medicine, a bad headache with vision changes or pain in the upper right area of your belly may mean you have high blood pressure or post birth preeclampsia. Call your provider immediately    SAFE SLEEP POSITIONING FOR YOUR BABY  The [employer] advises your baby should  be placed on his/her back for Sleeping to reduce the risk of Sudden Infant Death Syndrome (SIDS)  · Baby should sleep by themselves in a crib, portable crib or bassinet  · Baby should not share a bed with his/her parents  · Baby should be placed on his or her back to sleep, night time and at naps  · Baby should sleep on firm mattress with a tightly fitted sheet  · NO couches, waterbeds or anything soft  · Baby's sleep area should not contain any loose blankets, comforters, stuffed animals or any other soft items, (pillows, bumper pads, etc. ...)  · Baby's face should be kept uncovered at all times  · Baby should sleep in a smoke-free environment  · Do not dress baby too warmly to prevent overheating    HAND WASHING  All family and friends should wash their hands:  · Before and after holding the baby  · Before feeding the baby  · After using the restroom or changing the baby's diaper     CARE    TAKING BABY'S TEMPERATURE  · If you feel your baby may have a fever take your baby's temperature per thermometer instructions  · If taking axillary temperature place thermometer under baby's armpit and hold arm close to body  · The most precise and accurate way to take a temperature is rectally  · Turn on the digital thermometer and lubricate the tip of the thermometer with petroleum jelly.  · Lay your baby or child on his or her back, lift his or her thighs, and insert the lubricated thermometer 1/2 to 1 inch (1.3 to 2.5 centimeters) into the rectum  · Call your Pediatrician for temperature lower than 97.6 or greater than 100.4 F rectally    BATHE AND SHAMPOO BABY  · Gently wash baby with a soft cloth using warm water and mild soap - rinse well  · Do not put baby in tub bath until umbilical cord falls off and appears well-healed  · Bathing baby 2-3 times a week might be enough until your baby becomes more mobile. Bathing your baby too much can dry out his or her skin     NAIL CARE  · First recommendation is to keep  them covered to prevent facial scratching  · During the first few weeks,  nails are very soft. Doctors recommend using only a fine emery board. Don't bite or tear your baby's nails. When your baby's nails are stronger, after a few weeks, you can switch to clippers or scissors making sure not to cut too short and nip the quick   · A good time for nail care is while your baby is sleeping and moving less    CORD CARE  · Fold diaper below umbilical cord until cord falls off  · Keep umbilical cord clean and dry  · May see a small amount of crust around the base of the cord. Clean off with mild soap and water and dry             DIAPER AND DRESS BABY  · For baby girls: gently wipe from front to back. Mucous or pink tinged drainage is normal  · For uncircumcised baby boys: do NOT pull back the foreskin to clean the penis. Gently clean with wipes or warm, soapy water  · Dress baby in one more layer of clothing than you are wearing  · Use a hat to protect from sun or cold. NO ties or drawstrings    URINATION AND BOWEL MOVEMENTS  · If formula feeding or when breast milk feeding is established, your baby should wet 6-8 diapers a day and have at least 2 bowel movements a day during the first month  · Bowel movements color and type can vary from day to day    CIRCUMCISION  What to watch out for:  · Foul smelling discharge  · Fever  · Swelling   · Crusty, fluid filled sores  · Trouble urinating   · Persistent bleeding or more than a quarter size spot of blood on his diaper  · Yellow discharge lasting more than a week  · Continue with care procedures until healed or have a visit with your Pediatrician     INFANT FEEDING  · Most newborns feed 8-12 times, every 24 hours. YOU MAY NEED TO WAKE YOUR BABY UP TO FEED  · If breastfeeding, offer both breasts when your baby is showing feeding cues, such as rooting or bringing hand to mouth and sucking  · Common for  babies to feed every 1-3 hours   · Only allow baby to sleep  up to 4 hours in between feeds if baby is feeding well at each feed. Offer breast anytime baby is showing feeding cues and at least every 3 hours  · Follow up with outpatient [facility] for continued breast feeding support    FORMULA FEEDING  · Feed baby formula every 2-3 hours when your baby is showing feeding cues  · Paced bottle feeding will help baby not over eat at each feed     BOTTLE FEEDING   · Paced Bottle Feeding is a method of bottle feeding that allows the infant to be more in control of the feeding pace. This feeding method slows down the flow of milk into the nipple and the mouth, allowing the baby to eat more slowly, and take breaks. Paced feeding reduces the risk of overfeeding that may result in discomfort for the baby   · Hold baby almost upright or slightly reclined position supporting the head and neck  · Use a small nipple for slow-flowing. Slow flow nipple holes help in controlling flow   · Don't force the bottle's nipple into your baby's mouth. Tickle babies lip so baby opens their mouth  · Insert nipple and hold the bottle flat  · Let the baby suck three to four times without milk then tip the bottle just enough to fill the nipple about snf with milk  · Let baby suck 3-5 continuous swallows, about 20-30 seconds tip the bottle down to give the baby a break  · After a few seconds, when the baby begins to suck again, tip bottle up to allow milk to flow into the nipple  · Continue to Pace feed until baby shows signs of fullness; no longer sucking after a break, turning away or pushing away the nipple   · Bottle propping is not a recommended practice for feeding  · Bottle propping is when you give a baby a bottle by leaning the bottle against a pillow, or other support, rather than holding the baby and the bottle.  · Forces your baby to keep up with the flow, even if the baby is full   · This can increase your baby's risk of choking, ear infections, and tooth decay    BOTTLE  "PREPARATION   · Never feed  formula to your baby, or use formula if the container is dented  · When using ready-to-feed, shake formula containers before opening  · If formula is in a can, clean the lid of any dust, and be sure the can opener is clean  · Formula does not need to be warmed. If you choose to feed warmed formula, do not microwave it. This can cause \"hot spots\" that could burn your baby. Instead, set the filled bottle in a bowl of warm (not boiling) water or hold the bottle under warm tap water. Sprinkle a few drops of formula on the inside of your wrist to make sure it's not too hot  · Measure and pour desired amount of water into baby bottle  · Add unpacked, level scoop(s) of powder to the bottle as directed on formula container. Return dry scoop to can  · Put the cap on the bottle and shake. Move your wrist in a twisting motion helps powder formula mix more quickly and more thoroughly  · Feed or store immediately in refrigerator  · You need to sterilize bottles, nipples, rings, etc., only before the first use    CLEANING BOTTLE  · Use hot, soapy water  · Rinse the bottles and attachments separately and clean with a bottle brush  · If your bottles are labelled  safe, you can alternatively go ahead and wash them in the    · After washing, rinse the bottle parts thoroughly in hot running water to remove any bubbles or soap residue   · Place the parts on a bottle drying rack   · Make sure the bottles are left to drain in a well-ventilated location to ensure that they dry thoroughly  CAR SEAT  For your baby's safety and to comply with [facility] Law you will need to bring a car seat to the hospital before taking your baby home. Please read your car seat instructions before your baby's discharge from the hospital.  · Make sure you place an emergency contact sticker on your baby's car seat with your baby's identifying information  · Car seat should not be placed in the front seat " of a vehicle. The car seat should be placed in the back seat in the rear-facing position.  · Car seat information is available through [employer] at [phone] and also at [email or web address]    MATERNAL CARE     WOUND CARE  Ask your physician for additional care instructions. In general:  ·  Incision:  · May shower and pat incision dry   · Keep the incision clean and dry  · There should not be any opening or pus from the incision  · Continue to walk at home 3 times a day   · Do NOT lift anything heavier than your baby (over 10 pounds)  · Encourage family to help participate in care of the  to allow rest and mom time to heal    · Episiotomy/Laceration  · May use riddhi-spray bottle, witch hazel pads and dermaplast spray for comfort  · Use riddhi-spray bottle after urinating to cleanse perineal area  · To prevent burning during urination spray riddhi-water bottle on labial area   · Pat perineal area dry until episiotomy/laceration is healed  · Continue to use riddhi-bottle until bleeding stops as needed  · If have a 2nd degree laceration or greater, a Sitz bath can offer relief from soreness, burning, and inflammation   · Sitz Bath   · Sit in 6 inches of warm water and soak laceration as needed until the laceration heals    VAGINAL CARE AND BLEEDING  · Nothing inside vagina for 6 weeks:   · No sexual intercourse, tampons or douching  · Bleeding may continue for 2-4 weeks. Amount and color may vary  · Soaking 1 pad or more in an hour for several hours is considered heavy bleeding  · Passing large egg sized blood clots can be concerning  · If you feel like you have heavy bleeding or are having increasing amount of blood clots call your Obstetrician immediately  · If you begin feeling faint upon standing, feeling sick to your stomach, have clammy skin, a really fast heartbeat, have chills, start feeling confused, dizzy, sleepy or weak, or feeling like you're going to faint call your Obstetrician  "immediately    HYPERTENSION   Preeclampsia or gestational hypertension are types of high blood pressure that only pregnant women can get. It is important for you to be aware of symptoms to seek early intervention and treatment. If you have any of these symptoms immediately call your Obstetrician    · Vision changes or blurred vision   · Severe headache or pain that is unrelieved with medication and will not go away  · Persistent pain in upper abdomen or shoulder   · Increased swelling of face, feet, or hands  · Difficulty breathing or shortness of breath at rest  · Urinating less than usual    URINATION AND BOWEL MOVEMENTS  · Eating more fiber (bran cereal, fruits, and vegetables) and drinking plenty of fluids will help to avoid constipation  · Urinary frequency and urgency after childbirth is normal  · If you experience any urinary pain, burning or frequency call your provider    BIRTH CONTROL  · It is possible to become pregnant at any time after delivery and while breastfeeding  · Plan to discuss a method of birth control with your physician at your post-delivery follow up visit    POSTPARTUM BLUES  During the first few days after birth, you may experience a sense of the \"blues\" which may include impatience, irritability or even crying. These feelings come and go quickly. However, as many as 1 in 10 women experience emotional symptoms known as postpartum depression.     POSTPARTUM DEPRESSION    May start as early as the second or third day after delivery or take several weeks or months to develop. Symptoms of \"blues\" are present, but are more intense: Crying spells; loss of appetite; feelings of hopelessness or loss of control; fear of touching the baby; over concern or no concern at all about the baby; little or no concern about your own appearance/caring for yourself; and/or inability to sleep or excessive sleeping. Contact your Obstetrician if you are experiencing any of these symptoms     PREVENTING SHAKEN " "BABY  If you are angry or stressed, PUT THE BABY IN THE CRIB, step away, take some deep breaths, and wait until you are calm to care for the baby. DO NOT SHAKE THE BABY. You are not alone, call a supporter for help.  · [facility] 24/7 crisis call line (341-963-0896) or (1-746.604.7651)  · You can also text them, text \"ANSWER\" (479592)      "

## 2021-07-11 LAB — GP B STREP DNA SPEC QL NAA+PROBE: POSITIVE

## 2021-07-12 PROBLEM — O99.820 GBS (GROUP B STREPTOCOCCUS CARRIER), +RV CULTURE, CURRENTLY PREGNANT: Status: ACTIVE | Noted: 2021-07-12

## 2021-10-16 DIAGNOSIS — R82.90 ABNORMAL URINE ODOR: ICD-10-CM
